# Patient Record
Sex: MALE | Race: WHITE | NOT HISPANIC OR LATINO
[De-identification: names, ages, dates, MRNs, and addresses within clinical notes are randomized per-mention and may not be internally consistent; named-entity substitution may affect disease eponyms.]

---

## 2018-01-11 ENCOUNTER — TRANSCRIPTION ENCOUNTER (OUTPATIENT)
Age: 33
End: 2018-01-11

## 2018-01-22 ENCOUNTER — TRANSCRIPTION ENCOUNTER (OUTPATIENT)
Age: 33
End: 2018-01-22

## 2019-01-24 ENCOUNTER — TRANSCRIPTION ENCOUNTER (OUTPATIENT)
Age: 34
End: 2019-01-24

## 2021-09-30 ENCOUNTER — APPOINTMENT (OUTPATIENT)
Dept: THORACIC SURGERY | Facility: CLINIC | Age: 36
End: 2021-09-30
Payer: MEDICAID

## 2021-09-30 ENCOUNTER — NON-APPOINTMENT (OUTPATIENT)
Age: 36
End: 2021-09-30

## 2021-09-30 VITALS
BODY MASS INDEX: 19.62 KG/M2 | RESPIRATION RATE: 17 BRPM | HEART RATE: 73 BPM | SYSTOLIC BLOOD PRESSURE: 117 MMHG | TEMPERATURE: 99 F | HEIGHT: 67 IN | WEIGHT: 125 LBS | OXYGEN SATURATION: 98 % | DIASTOLIC BLOOD PRESSURE: 79 MMHG

## 2021-09-30 PROCEDURE — 99205 OFFICE O/P NEW HI 60 MIN: CPT

## 2021-10-01 NOTE — PHYSICAL EXAM
[General Appearance - Alert] : alert [General Appearance - Well Nourished] : well nourished [Sclera] : the sclera and conjunctiva were normal [PERRL With Normal Accommodation] : pupils were equal in size, round, and reactive to light [Outer Ear] : the ears and nose were normal in appearance [Hearing Threshold Finger Rub Not [place]] : hearing was normal [Neck Appearance] : the appearance of the neck was normal [Neck Cervical Mass (___cm)] : no neck mass was observed [Respiration, Rhythm And Depth] : normal respiratory rhythm and effort [Exaggerated Use Of Accessory Muscles For Inspiration] : no accessory muscle use [Auscultation Breath Sounds / Voice Sounds] : lungs were clear to auscultation bilaterally [Heart Rate And Rhythm] : heart rate was normal and rhythm regular [Heart Sounds] : normal S1 and S2 [Examination Of The Chest] : the chest was normal in appearance [Chest Visual Inspection Thoracic Asymmetry] : no chest asymmetry [2+] : left 2+ [No Abnormalities] : the abdominal aorta was not enlarged and no bruit was heard [No Pulse Delay] : no pulse delay [Bowel Sounds] : normal bowel sounds [Abdomen Soft] : soft [Abdomen Tenderness] : non-tender [Cervical Lymph Nodes Enlarged Posterior Bilaterally] : posterior cervical [Cervical Lymph Nodes Enlarged Anterior Bilaterally] : anterior cervical [No CVA Tenderness] : no ~M costovertebral angle tenderness [Abnormal Walk] : normal gait [Nail Clubbing] : no clubbing  or cyanosis of the fingernails [Involuntary Movements] : no involuntary movements were seen [Skin Color & Pigmentation] : normal skin color and pigmentation [Skin Turgor] : normal skin turgor [] : no rash [Sensation] : the sensory exam was normal to light touch and pinprick [No Focal Deficits] : no focal deficits [Oriented To Time, Place, And Person] : oriented to person, place, and time [Affect] : the affect was normal [Mood] : the mood was normal [Fingers] :  capillary refill of the fingers was normal [Varicose Veins Of The Left Leg] : the patient has no varicose veins of the left leg [Varicose Veins Of The Right Leg] : the patient has no varicose veins of the right leg

## 2021-10-01 NOTE — ASSESSMENT
63.5 [FreeTextEntry1] :  36 year old male, never smoker, w/ hx of pectus excavatum since age of 8, he is referred by his PCP for evaluation of pectus excavatum. \par \par CT Chest on 9/21/21:\par - pectus excavatum is noted, Sb index is 26.8 cm/6.6cm=4.06\par - tree-in-bud opacities in upper lobes bilaterally with small irregular shaped cystic cavities in bilateral upper lobes\par \par \par I have reviewed the patient's medical records and diagnostic images at time of this office consultation and have made the following recommendation:\par 1. Chest deformity noted from pt, pt concerns about appearance of the chest, surgical option including Ellis procedure and Ellis bar placement were explained to pt in detail. He verbalized understanding and would like to complete workup and think about it. \par 2. PFT. \par 3. Echo to evaluate cardiac function. \par 4. RTC after above done. \par \par \par I personally performed the services described in the documentation, reviewed the documentation recorded by the scribe in my presence and it accurately and completely records my words and actions. \par \par I, Rosario Shane, ADONIS, am scribing for and the presence of Dr. Mikey Kam the following sections, HISTORY OF PRESENT ILLNESS, PAST MEDICAL/FAMILY/SOCIAL HISTORY; REVIEW OF SYSTEMS; VITAL SIGNS; PHYSICAL EXAM; DISPOSITION.\par

## 2021-10-01 NOTE — CONSULT LETTER
[Dear  ___] : Dear  [unfilled], [Consult Letter:] : I had the pleasure of evaluating your patient, [unfilled]. [( Thank you for referring [unfilled] for consultation for _____ )] : Thank you for referring [unfilled] for consultation for [unfilled] [Please see my note below.] : Please see my note below. [Consult Closing:] : Thank you very much for allowing me to participate in the care of this patient.  If you have any questions, please do not hesitate to contact me. [Sincerely,] : Sincerely, [FreeTextEntry2] : Dr. Norman Cheek (ref) [FreeTextEntry3] : Mikey Kam MD, MPH \par System Director of Thoracic Surgery \par Director of Comprehensive Lung and Foregut [place] \par Professor Cardiovascular & Thoracic Surgery  \par [facility] at [facility]\par

## 2021-10-01 NOTE — HISTORY OF PRESENT ILLNESS
[FreeTextEntry1] : Mr. JOYCE LIEBERMAN, 36 year old male, never smoker, w/ hx of pectus excavatum since age of 8, he is referred by his PCP for evaluation of pectus excavatum. \par \par CT Chest on 9/21/21:\par - pectus excavatum is noted, Sb index is 26.8 cm/6.6cm=4.06\par - tree-in-bud opacities in upper lobes bilaterally with small irregular shaped cystic cavities in bilateral upper lobes\par \par Pt presents today for CT Sx consultation, referred by Dr. Norman Cheek. The patient denies SOB, cough, chest pain, hemoptysis, palpitation, fever, recent illness, hospitalization and significant weight loss.\par

## 2021-10-22 DIAGNOSIS — Z01.818 ENCOUNTER FOR OTHER PREPROCEDURAL EXAMINATION: ICD-10-CM

## 2021-10-23 ENCOUNTER — APPOINTMENT (OUTPATIENT)
Dept: DISASTER EMERGENCY | Facility: CLINIC | Age: 36
End: 2021-10-23

## 2021-10-24 LAB — SARS-COV-2 N GENE NPH QL NAA+PROBE: NOT DETECTED

## 2021-10-27 ENCOUNTER — APPOINTMENT (OUTPATIENT)
Dept: PULMONOLOGY | Facility: CLINIC | Age: 36
End: 2021-10-27
Payer: MEDICAID

## 2021-10-27 VITALS
BODY MASS INDEX: 19.62 KG/M2 | OXYGEN SATURATION: 99 % | TEMPERATURE: 98.6 F | HEART RATE: 63 BPM | WEIGHT: 125 LBS | HEIGHT: 67 IN

## 2021-10-27 PROCEDURE — 94729 DIFFUSING CAPACITY: CPT

## 2021-10-27 PROCEDURE — 94010 BREATHING CAPACITY TEST: CPT

## 2021-10-27 PROCEDURE — 94726 PLETHYSMOGRAPHY LUNG VOLUMES: CPT

## 2021-11-30 ENCOUNTER — APPOINTMENT (OUTPATIENT)
Dept: PULMONOLOGY | Facility: CLINIC | Age: 36
End: 2021-11-30
Payer: MEDICAID

## 2021-11-30 VITALS
BODY MASS INDEX: 19.62 KG/M2 | HEART RATE: 105 BPM | OXYGEN SATURATION: 96 % | WEIGHT: 125 LBS | RESPIRATION RATE: 18 BRPM | HEIGHT: 67 IN

## 2021-11-30 DIAGNOSIS — J98.4 OTHER DISORDERS OF LUNG: ICD-10-CM

## 2021-11-30 PROCEDURE — 99203 OFFICE O/P NEW LOW 30 MIN: CPT

## 2021-12-03 LAB
A1AT SERPL-MCNC: 148 MG/DL
ALBUMIN SERPL ELPH-MCNC: 5 G/DL
ALP BLD-CCNC: 72 U/L
ALT SERPL-CCNC: 14 U/L
ANA SER IF-ACNC: NEGATIVE
ANION GAP SERPL CALC-SCNC: 16 MMOL/L
AST SERPL-CCNC: 18 U/L
BASOPHILS # BLD AUTO: 0.05 K/UL
BASOPHILS NFR BLD AUTO: 0.7 %
BILIRUB SERPL-MCNC: 0.2 MG/DL
BUN SERPL-MCNC: 25 MG/DL
CALCIUM SERPL-MCNC: 10.2 MG/DL
CCP AB SER IA-ACNC: <8 UNITS
CHLORIDE SERPL-SCNC: 103 MMOL/L
CO2 SERPL-SCNC: 21 MMOL/L
CREAT SERPL-MCNC: 1.02 MG/DL
DEPRECATED KAPPA LC FREE/LAMBDA SER: 1.1 RATIO
ENA SS-A AB SER IA-ACNC: <0.2 AL
ENA SS-B AB SER IA-ACNC: <0.2 AL
EOSINOPHIL # BLD AUTO: 0.23 K/UL
EOSINOPHIL NFR BLD AUTO: 3.3 %
GLUCOSE SERPL-MCNC: 130 MG/DL
HCT VFR BLD CALC: 44 %
HGB BLD-MCNC: 14.5 G/DL
HIV1+2 AB SPEC QL IA.RAPID: NONREACTIVE
IGA SER QL IEP: 252 MG/DL
IGG SER QL IEP: 1113 MG/DL
IGM SER QL IEP: 68 MG/DL
IMM GRANULOCYTES NFR BLD AUTO: 0.3 %
KAPPA LC CSF-MCNC: 1.12 MG/DL
KAPPA LC SERPL-MCNC: 1.23 MG/DL
LYMPHOCYTES # BLD AUTO: 1.34 K/UL
LYMPHOCYTES NFR BLD AUTO: 19.4 %
MAN DIFF?: NORMAL
MCHC RBC-ENTMCNC: 28.9 PG
MCHC RBC-ENTMCNC: 33 GM/DL
MCV RBC AUTO: 87.8 FL
MONOCYTES # BLD AUTO: 0.65 K/UL
MONOCYTES NFR BLD AUTO: 9.4 %
NEUTROPHILS # BLD AUTO: 4.63 K/UL
NEUTROPHILS NFR BLD AUTO: 66.9 %
PLATELET # BLD AUTO: 274 K/UL
POTASSIUM SERPL-SCNC: 4.4 MMOL/L
PROT SERPL-MCNC: 7.8 G/DL
RBC # BLD: 5.01 M/UL
RBC # FLD: 13 %
RF+CCP IGG SER-IMP: NEGATIVE
RHEUMATOID FACT SER QL: <10 IU/ML
SODIUM SERPL-SCNC: 140 MMOL/L
WBC # FLD AUTO: 6.92 K/UL

## 2021-12-08 NOTE — HISTORY OF PRESENT ILLNESS
[TextBox_4] : 35 yo M never smoker with a h/o pectus excavatum and abnormal CT chest presents for initial evaluation.\par Referred by Dr. Mikey Kam.\par Denies SOB or cough. \par Stable weight. Denies fever, chills or night sweats.\par Denies wheezing. \par Denies arthralgias, rashes. +peripheral neuropathy - feet and fingers for which he underwent extensive neurologic work up in 2014 with Dr. Ambar Melton in [place] including skin biopsy, found to have small fiber neuropathy on pathology.  \par  \par Denies significant cardiopulmonary history other than a hospitalization in 2010 for syncope and abnormal CT chest findings. Was treated for pneumonia at that time and followed up with Dr. Perez with serial CT chest, lost to follow up/p. Never had lung biopsy. \par \par CT chest 9/21/21: bilateral upper lobes with small irregular shaped cysts\par B/l lower lobe peripheral opacities. \par Review of previous CT chest imaging in 2010 and 2011 shows multiple peripheral lower lobe opacities which slowly improved but never resolved \par \par PFTs 10/27/21: moderate obstructive defect, mild decrease in DLCO. \par \par Past medical history includes HLD, chronic neck and back pain.\par Meds: Flexeril PRN \par \par Maternal grandmother: RA, PMR \par \par Quality metrics:\par Tobacco use - never\par ESS- NA\par \par Vaccines: \par COVID: April completed series- booster this Monday 12/6. \par Influenza: annually, \par Pneumococcal: NA\par

## 2021-12-08 NOTE — ASSESSMENT
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  \par Labs: CBC with diff, CMP, Immunoglobulins, RA, CCP, Sjrogren's Abs, HIV, Alpha 1 antitrypsin\par Echo results to be sent for review\par Follow up after.\par

## 2021-12-08 NOTE — PHYSICAL EXAM
[No Acute Distress] : no acute distress [Normal Oropharynx] : normal oropharynx [Normal Appearance] : normal appearance [No Neck Mass] : no neck mass [Normal Rate/Rhythm] : normal rate/rhythm [Normal S1, S2] : normal s1, s2 [No Murmurs] : no murmurs [No Resp Distress] : no resp distress [Clear to Auscultation Bilaterally] : clear to auscultation bilaterally [Benign] : benign [Normal Gait] : normal gait [No Clubbing] : no clubbing [No Cyanosis] : no cyanosis [No Edema] : no edema [FROM] : FROM [Normal Color/ Pigmentation] : normal color/ pigmentation [No Focal Deficits] : no focal deficits [Oriented x3] : oriented x3 [Normal Affect] : normal affect [TextBox_80] : pectus excavatum

## 2022-01-28 ENCOUNTER — NON-APPOINTMENT (OUTPATIENT)
Age: 37
End: 2022-01-28

## 2022-03-16 ENCOUNTER — APPOINTMENT (OUTPATIENT)
Dept: CT IMAGING | Facility: CLINIC | Age: 37
End: 2022-03-16
Payer: MEDICAID

## 2022-03-16 PROCEDURE — 71250 CT THORAX DX C-: CPT

## 2022-03-18 ENCOUNTER — APPOINTMENT (OUTPATIENT)
Dept: PULMONOLOGY | Facility: CLINIC | Age: 37
End: 2022-03-18
Payer: MEDICAID

## 2022-03-18 PROCEDURE — 99442: CPT

## 2023-03-15 ENCOUNTER — NON-APPOINTMENT (OUTPATIENT)
Age: 38
End: 2023-03-15

## 2023-03-16 ENCOUNTER — APPOINTMENT (OUTPATIENT)
Dept: THORACIC SURGERY | Facility: CLINIC | Age: 38
End: 2023-03-16
Payer: MEDICAID

## 2023-03-16 VITALS
HEART RATE: 97 BPM | RESPIRATION RATE: 18 BRPM | HEIGHT: 67 IN | OXYGEN SATURATION: 98 % | SYSTOLIC BLOOD PRESSURE: 117 MMHG | DIASTOLIC BLOOD PRESSURE: 87 MMHG | BODY MASS INDEX: 20.4 KG/M2 | WEIGHT: 130 LBS

## 2023-03-16 PROCEDURE — 99215 OFFICE O/P EST HI 40 MIN: CPT

## 2023-03-16 RX ORDER — CETIRIZINE HCL 10 MG
TABLET ORAL
Refills: 0 | Status: ACTIVE | COMMUNITY

## 2023-03-16 RX ORDER — UBIDECARENONE/VIT E ACET 100MG-5
CAPSULE ORAL
Refills: 0 | Status: ACTIVE | COMMUNITY

## 2023-03-16 RX ORDER — PSYLLIUM HUSK 0.4 G
CAPSULE ORAL
Refills: 0 | Status: ACTIVE | COMMUNITY

## 2023-03-20 NOTE — HISTORY OF PRESENT ILLNESS
[FreeTextEntry1] : Mr. JOYCE LIEBERMAN, 38 year old male, never smoker, w/ hx of pectus excavatum since age of 8, he is referred by his PCP for evaluation of pectus excavatum. \par \par CT Chest on 9/21/21:\par - pectus excavatum is noted, Sb index is 26.8 cm/6.6cm=4.06\par - tree-in-bud opacities in upper lobes bilaterally with small irregular shaped cystic cavities in bilateral upper lobes\par \par Pt initial seen in office 9/30/2021, referred by Dr. Norman Cheek. Chest deformity noted, pt was concerned about appearance, surgical option including Ellis procedure and Ellis bar placement were explained to pt in detail. He  would like to complete workup (PFTs and ECHO) and think about it. Pt has lost f/u since then.\par \par PFT 10/27/2021: FVC 80%, FEV1: 66%, DLCO:68%\par \par CT Chest 3/16/2022:\par -similar upper lobe cavitary lesions with surrounding nodularity, similar to prior CT scan\par -B/L lower lobe peripheral nodularity/scarring in the same location of nodular consolidations seen on prior CTs\par \par Patient is here today for a follow up. Patient states he has made his decision and is here to proceed with Ellis bar placement. Denies chest pain, SOB, & fever. States he's feeling great. \par

## 2023-03-20 NOTE — CONSULT LETTER
[Dear  ___] : Dear  [unfilled], [Consult Letter:] : I had the pleasure of evaluating your patient, [unfilled]. [( Thank you for referring [unfilled] for consultation for _____ )] : Thank you for referring [unfilled] for consultation for [unfilled] [Please see my note below.] : Please see my note below. [Consult Closing:] : Thank you very much for allowing me to participate in the care of this patient.  If you have any questions, please do not hesitate to contact me. [Sincerely,] : Sincerely, [FreeTextEntry2] : Dr. Norman Cheek (ref) [FreeTextEntry3] : Mikey Kam MD, MPH \par System Director of Thoracic Surgery \par Director of Comprehensive Lung and Foregut [place] \par Professor Cardiovascular & Thoracic Surgery  \par [facility] [employer] at [facility]\par

## 2023-03-20 NOTE — ASSESSMENT
[FreeTextEntry1] : 38 year old male, never smoker, w/ hx of pectus excavatum since age of 8, he is referred by his PCP for evaluation of pectus excavatum. \par \par Pt initial seen in office 9/30/2021, referred by Dr. Norman Cheek. Chest deformity noted, pt was concerned about appearance, surgical option including Ellis procedure and Ellis bar placement were explained to pt in detail. He  would like to complete workup (PFTs and ECHO) and think about it. Pt has lost f/u since then.\par \par I have reviewed the patient's medical records and diagnostic images at time of this office consultation and have made the following recommendation:\par 1. Will F/U echo from Dr. Brooks Frnacisco\par 2. Since patient is ready to proceed, will schedule B/L VATS R.A. repair pectus Ellis procedure, cryoablation of intercostal nerve (4/11 last case, will order 2 Ellis bars via [employer]). Risks and benefits and alternatives explained to patient, all questions answered, patient agreed to proceed with surgery. Patient will need medical clearance, PST and covid test prior to surgery. \par \par \par I, ERIKA Paz, personally performed the evaluation and management (E/M) services for this established patient who presents today with (a) new problem(s)/exacerbation of (an) existing condition(s). That E/M includes conducting the examination, assessing all new/exacerbated conditions, and establishing a new plan of care. Today, my RN, Enrrique Dean, was here to observe my evaluation and management services for this new problem/exacerbated condition to be followed going forward.\par \par

## 2023-03-27 ENCOUNTER — OUTPATIENT (OUTPATIENT)
Dept: OUTPATIENT SERVICES | Facility: HOSPITAL | Age: 38
LOS: 1 days | End: 2023-03-27
Payer: MEDICAID

## 2023-03-27 VITALS
HEIGHT: 68 IN | SYSTOLIC BLOOD PRESSURE: 125 MMHG | RESPIRATION RATE: 18 BRPM | HEART RATE: 83 BPM | DIASTOLIC BLOOD PRESSURE: 86 MMHG | OXYGEN SATURATION: 97 % | TEMPERATURE: 98 F | WEIGHT: 132.06 LBS

## 2023-03-27 DIAGNOSIS — Q67.6 PECTUS EXCAVATUM: ICD-10-CM

## 2023-03-27 DIAGNOSIS — Z91.09 OTHER ALLERGY STATUS, OTHER THAN TO DRUGS AND BIOLOGICAL SUBSTANCES: ICD-10-CM

## 2023-03-27 DIAGNOSIS — Z98.890 OTHER SPECIFIED POSTPROCEDURAL STATES: Chronic | ICD-10-CM

## 2023-03-27 LAB
ALBUMIN SERPL ELPH-MCNC: 4.6 G/DL — SIGNIFICANT CHANGE UP (ref 3.3–5)
ALP SERPL-CCNC: 65 U/L — SIGNIFICANT CHANGE UP (ref 40–120)
ALT FLD-CCNC: 14 U/L — SIGNIFICANT CHANGE UP (ref 4–41)
ANION GAP SERPL CALC-SCNC: 14 MMOL/L — SIGNIFICANT CHANGE UP (ref 7–14)
AST SERPL-CCNC: 22 U/L — SIGNIFICANT CHANGE UP (ref 4–40)
BASOPHILS # BLD AUTO: 0.06 K/UL — SIGNIFICANT CHANGE UP (ref 0–0.2)
BASOPHILS NFR BLD AUTO: 0.7 % — SIGNIFICANT CHANGE UP (ref 0–2)
BILIRUB DIRECT SERPL-MCNC: <0.2 MG/DL — SIGNIFICANT CHANGE UP (ref 0–0.3)
BILIRUB INDIRECT FLD-MCNC: >0.1 MG/DL — SIGNIFICANT CHANGE UP (ref 0–1)
BILIRUB SERPL-MCNC: 0.3 MG/DL — SIGNIFICANT CHANGE UP (ref 0.2–1.2)
BUN SERPL-MCNC: 25 MG/DL — HIGH (ref 7–23)
CALCIUM SERPL-MCNC: 9.7 MG/DL — SIGNIFICANT CHANGE UP (ref 8.4–10.5)
CHLORIDE SERPL-SCNC: 101 MMOL/L — SIGNIFICANT CHANGE UP (ref 98–107)
CO2 SERPL-SCNC: 24 MMOL/L — SIGNIFICANT CHANGE UP (ref 22–31)
CREAT SERPL-MCNC: 1 MG/DL — SIGNIFICANT CHANGE UP (ref 0.5–1.3)
EGFR: 99 ML/MIN/1.73M2 — SIGNIFICANT CHANGE UP
EOSINOPHIL # BLD AUTO: 0.5 K/UL — SIGNIFICANT CHANGE UP (ref 0–0.5)
EOSINOPHIL NFR BLD AUTO: 6.2 % — HIGH (ref 0–6)
GLUCOSE SERPL-MCNC: 76 MG/DL — SIGNIFICANT CHANGE UP (ref 70–99)
HCT VFR BLD CALC: 38.1 % — LOW (ref 39–50)
HGB BLD-MCNC: 12.3 G/DL — LOW (ref 13–17)
IANC: 5.34 K/UL — SIGNIFICANT CHANGE UP (ref 1.8–7.4)
IMM GRANULOCYTES NFR BLD AUTO: 0.1 % — SIGNIFICANT CHANGE UP (ref 0–0.9)
LYMPHOCYTES # BLD AUTO: 1.45 K/UL — SIGNIFICANT CHANGE UP (ref 1–3.3)
LYMPHOCYTES # BLD AUTO: 18 % — SIGNIFICANT CHANGE UP (ref 13–44)
MCHC RBC-ENTMCNC: 28 PG — SIGNIFICANT CHANGE UP (ref 27–34)
MCHC RBC-ENTMCNC: 32.3 GM/DL — SIGNIFICANT CHANGE UP (ref 32–36)
MCV RBC AUTO: 86.6 FL — SIGNIFICANT CHANGE UP (ref 80–100)
MONOCYTES # BLD AUTO: 0.7 K/UL — SIGNIFICANT CHANGE UP (ref 0–0.9)
MONOCYTES NFR BLD AUTO: 8.7 % — SIGNIFICANT CHANGE UP (ref 2–14)
NEUTROPHILS # BLD AUTO: 5.34 K/UL — SIGNIFICANT CHANGE UP (ref 1.8–7.4)
NEUTROPHILS NFR BLD AUTO: 66.3 % — SIGNIFICANT CHANGE UP (ref 43–77)
NRBC # BLD: 0 /100 WBCS — SIGNIFICANT CHANGE UP (ref 0–0)
NRBC # FLD: 0 K/UL — SIGNIFICANT CHANGE UP (ref 0–0)
PLATELET # BLD AUTO: 232 K/UL — SIGNIFICANT CHANGE UP (ref 150–400)
POTASSIUM SERPL-MCNC: 3.7 MMOL/L — SIGNIFICANT CHANGE UP (ref 3.5–5.3)
POTASSIUM SERPL-SCNC: 3.7 MMOL/L — SIGNIFICANT CHANGE UP (ref 3.5–5.3)
PROT SERPL-MCNC: 7.4 G/DL — SIGNIFICANT CHANGE UP (ref 6–8.3)
RBC # BLD: 4.4 M/UL — SIGNIFICANT CHANGE UP (ref 4.2–5.8)
RBC # FLD: 13.4 % — SIGNIFICANT CHANGE UP (ref 10.3–14.5)
SODIUM SERPL-SCNC: 139 MMOL/L — SIGNIFICANT CHANGE UP (ref 135–145)
WBC # BLD: 8.06 K/UL — SIGNIFICANT CHANGE UP (ref 3.8–10.5)
WBC # FLD AUTO: 8.06 K/UL — SIGNIFICANT CHANGE UP (ref 3.8–10.5)

## 2023-03-27 PROCEDURE — 93010 ELECTROCARDIOGRAM REPORT: CPT

## 2023-03-27 NOTE — H&P PST ADULT - HISTORY OF PRESENT ILLNESS
39 yo male presents to PST unit with pre-op diagnosis of pectus excavatum scheduled for robotic bilateral video assisted thoracoscopy, repair pectus kajal procedure, cryoablation of intercostal nerves with Dr. Kam.

## 2023-03-27 NOTE — H&P PST ADULT - ASSESSMENT
37 yo male presents to PST unit with pre-op diagnosis of pectus excavatum scheduled for robotic bilateral video assisted thoracoscopy, repair pectus kajal procedure, cryoablation of intercostal nerves with Dr. Kam.

## 2023-03-27 NOTE — H&P PST ADULT - NSICDXPASTMEDICALHX_GEN_ALL_CORE_FT
PAST MEDICAL HISTORY:  Anxiety     Environmental allergies     Food aversion Pt Notes once developed HIVES which he thinks may have been triggered by Chinese food - MSG (noted from waist up)    OCD (obsessive compulsive disorder)     Peripheral neuropathy Idiopathic - pt notes trying to determine cause - notes parternal grandmother and Father also have it - Hands & Feet

## 2023-03-27 NOTE — H&P PST ADULT - FUNCTIONAL STATUS
Walks 1 to 2 blocks, climbs 1 flight of stairs, ADLs 5-Walks 1 to 2 blocks, climbs 1 flight of stairs, ADLs

## 2023-03-27 NOTE — H&P PST ADULT - NEUROLOGICAL COMMENTS
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 Ambar Melton neurologist last seen years ago

## 2023-03-27 NOTE — H&P PST ADULT - PROBLEM SELECTOR PLAN 1
Scheduled for robotic bilateral video assisted thoracoscopy, repair pectus kajal procedure, cryoablation of intercostal nerves with Dr. Kam on 4/11/2023.  Verbal and written pre-op instructions provided to patient. Patient verbalized understanding and will call surgeons office for revised instructions if surgery is rescheduled.   Pepcid for GI prophylaxis provided.   patient given verbal and written instruction with teach back on chlorhexidine shampoo, and the patient verbalized understanding with return demonstration.   Patient will obtain medical clearance as per surgeons request-copy requested by PST NP for high risk surgery

## 2023-03-27 NOTE — H&P PST ADULT - NSICDXFAMILYHX_GEN_ALL_CORE_FT
FAMILY HISTORY:  Father  Still living? Yes, Estimated age: 51-60  Family history of peripheral neuropathy, Age at diagnosis: Age Unknown    Mother  Still living? Yes, Estimated age: 51-60  Hypercholesterolemia, Age at diagnosis: Age Unknown

## 2023-03-27 NOTE — H&P PST ADULT - WILL THE PATIENT ACCEPT THE PFIZER COVID-19 VACCINE IF ELIGIBLE AND IT IS AVAILABLE?
Cholesterol is well controlled on atorvastatin 40 mg daily  Continue as prescribed, will continue to monitor lipid panel  No

## 2023-03-30 ENCOUNTER — APPOINTMENT (OUTPATIENT)
Dept: THORACIC SURGERY | Facility: CLINIC | Age: 38
End: 2023-03-30
Payer: MEDICAID

## 2023-04-06 ENCOUNTER — APPOINTMENT (OUTPATIENT)
Dept: THORACIC SURGERY | Facility: CLINIC | Age: 38
End: 2023-04-06

## 2023-04-06 ENCOUNTER — APPOINTMENT (OUTPATIENT)
Dept: THORACIC SURGERY | Facility: CLINIC | Age: 38
End: 2023-04-06
Payer: MEDICAID

## 2023-04-06 PROBLEM — Z91.09 OTHER ALLERGY STATUS, OTHER THAN TO DRUGS AND BIOLOGICAL SUBSTANCES: Chronic | Status: ACTIVE | Noted: 2023-03-27

## 2023-04-06 PROBLEM — F42.9 OBSESSIVE-COMPULSIVE DISORDER, UNSPECIFIED: Chronic | Status: ACTIVE | Noted: 2023-03-27

## 2023-04-06 PROCEDURE — 99443: CPT

## 2023-04-07 ENCOUNTER — LABORATORY RESULT (OUTPATIENT)
Age: 38
End: 2023-04-07

## 2023-04-07 NOTE — REASON FOR VISIT
[This encounter was initiated by telehealth (audio with video) and converted to telephone (audio only) due to technical difficulties.] : This encounter was initiated by telehealth (audio with video) and converted to telephone (audio only) due to technical difficulties.

## 2023-04-10 NOTE — ASU PATIENT PROFILE, ADULT - FALL HARM RISK - UNIVERSAL INTERVENTIONS
Bed in lowest position, wheels locked, appropriate side rails in place/Call bell, personal items and telephone in reach/Instruct patient to call for assistance before getting out of bed or chair/Non-slip footwear when patient is out of bed/[place] to call system/Physically safe environment - no spills, clutter or unnecessary equipment/Purposeful Proactive Rounding/Room/bathroom lighting operational, light cord in reach

## 2023-04-11 ENCOUNTER — APPOINTMENT (OUTPATIENT)
Dept: THORACIC SURGERY | Facility: HOSPITAL | Age: 38
End: 2023-04-11

## 2023-04-11 ENCOUNTER — TRANSCRIPTION ENCOUNTER (OUTPATIENT)
Age: 38
End: 2023-04-11

## 2023-04-11 ENCOUNTER — INPATIENT (INPATIENT)
Facility: HOSPITAL | Age: 38
LOS: 0 days | Discharge: HOME | End: 2023-04-12
Attending: THORACIC SURGERY (CARDIOTHORACIC VASCULAR SURGERY) | Admitting: THORACIC SURGERY (CARDIOTHORACIC VASCULAR SURGERY)
Payer: MEDICAID

## 2023-04-11 VITALS
SYSTOLIC BLOOD PRESSURE: 132 MMHG | WEIGHT: 132.06 LBS | OXYGEN SATURATION: 100 % | HEIGHT: 67 IN | RESPIRATION RATE: 17 BRPM | DIASTOLIC BLOOD PRESSURE: 97 MMHG | HEART RATE: 90 BPM | TEMPERATURE: 99 F

## 2023-04-11 DIAGNOSIS — Q67.6 PECTUS EXCAVATUM: ICD-10-CM

## 2023-04-11 DIAGNOSIS — Z98.890 OTHER SPECIFIED POSTPROCEDURAL STATES: Chronic | ICD-10-CM

## 2023-04-11 PROCEDURE — 71045 X-RAY EXAM CHEST 1 VIEW: CPT | Mod: 26

## 2023-04-11 PROCEDURE — 99233 SBSQ HOSP IP/OBS HIGH 50: CPT

## 2023-04-11 PROCEDURE — 21743 REPAIR STERNUM/NUSS W/SCOPE: CPT

## 2023-04-11 PROCEDURE — 21743 REPAIR STERNUM/NUSS W/SCOPE: CPT | Mod: AS

## 2023-04-11 DEVICE — BAR PECTUS TI STABILIZER: Type: IMPLANTABLE DEVICE | Status: FUNCTIONAL

## 2023-04-11 DEVICE — PROBE CRYOABLATION SPHERE 11CM: Type: IMPLANTABLE DEVICE | Status: FUNCTIONAL

## 2023-04-11 DEVICE — IMPLANTABLE DEVICE: Type: IMPLANTABLE DEVICE | Status: FUNCTIONAL

## 2023-04-11 RX ORDER — HEPARIN SODIUM 5000 [USP'U]/ML
5000 INJECTION INTRAVENOUS; SUBCUTANEOUS EVERY 8 HOURS
Refills: 0 | Status: DISCONTINUED | OUTPATIENT
Start: 2023-04-11 | End: 2023-04-12

## 2023-04-11 RX ORDER — GABAPENTIN 400 MG/1
300 CAPSULE ORAL ONCE
Refills: 0 | Status: COMPLETED | OUTPATIENT
Start: 2023-04-11 | End: 2023-04-11

## 2023-04-11 RX ORDER — MORPHINE SULFATE 50 MG/1
0.3 CAPSULE, EXTENDED RELEASE ORAL ONCE
Refills: 0 | Status: DISCONTINUED | OUTPATIENT
Start: 2023-04-11 | End: 2023-04-12

## 2023-04-11 RX ORDER — HYDROMORPHONE HYDROCHLORIDE 2 MG/ML
1 INJECTION INTRAMUSCULAR; INTRAVENOUS; SUBCUTANEOUS
Refills: 0 | Status: DISCONTINUED | OUTPATIENT
Start: 2023-04-11 | End: 2023-04-12

## 2023-04-11 RX ORDER — CYCLOBENZAPRINE HYDROCHLORIDE 10 MG/1
1 TABLET, FILM COATED ORAL
Refills: 0 | DISCHARGE

## 2023-04-11 RX ORDER — ONDANSETRON 8 MG/1
4 TABLET, FILM COATED ORAL EVERY 6 HOURS
Refills: 0 | Status: DISCONTINUED | OUTPATIENT
Start: 2023-04-11 | End: 2023-04-12

## 2023-04-11 RX ORDER — OXYCODONE HYDROCHLORIDE 5 MG/1
5 TABLET ORAL
Refills: 0 | Status: DISCONTINUED | OUTPATIENT
Start: 2023-04-11 | End: 2023-04-12

## 2023-04-11 RX ORDER — HEPARIN SODIUM 5000 [USP'U]/ML
5000 INJECTION INTRAVENOUS; SUBCUTANEOUS ONCE
Refills: 0 | Status: COMPLETED | OUTPATIENT
Start: 2023-04-11 | End: 2023-04-11

## 2023-04-11 RX ORDER — SENNA PLUS 8.6 MG/1
2 TABLET ORAL AT BEDTIME
Refills: 0 | Status: DISCONTINUED | OUTPATIENT
Start: 2023-04-11 | End: 2023-04-12

## 2023-04-11 RX ORDER — HYDROMORPHONE HYDROCHLORIDE 2 MG/ML
0.5 INJECTION INTRAMUSCULAR; INTRAVENOUS; SUBCUTANEOUS
Refills: 0 | Status: DISCONTINUED | OUTPATIENT
Start: 2023-04-11 | End: 2023-04-12

## 2023-04-11 RX ORDER — CYCLOBENZAPRINE HYDROCHLORIDE 10 MG/1
10 TABLET, FILM COATED ORAL THREE TIMES A DAY
Refills: 0 | Status: DISCONTINUED | OUTPATIENT
Start: 2023-04-11 | End: 2023-04-12

## 2023-04-11 RX ORDER — SODIUM CHLORIDE 9 MG/ML
1000 INJECTION, SOLUTION INTRAVENOUS
Refills: 0 | Status: DISCONTINUED | OUTPATIENT
Start: 2023-04-11 | End: 2023-04-12

## 2023-04-11 RX ORDER — OXYCODONE HYDROCHLORIDE 5 MG/1
10 TABLET ORAL
Refills: 0 | Status: DISCONTINUED | OUTPATIENT
Start: 2023-04-11 | End: 2023-04-12

## 2023-04-11 RX ORDER — ACETAMINOPHEN 500 MG
975 TABLET ORAL ONCE
Refills: 0 | Status: COMPLETED | OUTPATIENT
Start: 2023-04-11 | End: 2023-04-11

## 2023-04-11 RX ORDER — CLOMIPRAMINE HYDROCHLORIDE 50 MG/1
1 CAPSULE ORAL
Refills: 0 | DISCHARGE

## 2023-04-11 RX ORDER — NALOXONE HYDROCHLORIDE 4 MG/.1ML
0.1 SPRAY NASAL
Refills: 0 | Status: DISCONTINUED | OUTPATIENT
Start: 2023-04-11 | End: 2023-04-12

## 2023-04-11 RX ORDER — ONDANSETRON 8 MG/1
4 TABLET, FILM COATED ORAL ONCE
Refills: 0 | Status: COMPLETED | OUTPATIENT
Start: 2023-04-11 | End: 2023-04-11

## 2023-04-11 RX ORDER — POLYETHYLENE GLYCOL 3350 17 G/17G
17 POWDER, FOR SOLUTION ORAL DAILY
Refills: 0 | Status: DISCONTINUED | OUTPATIENT
Start: 2023-04-11 | End: 2023-04-12

## 2023-04-11 RX ADMIN — Medication 975 MILLIGRAM(S): at 12:36

## 2023-04-11 RX ADMIN — GABAPENTIN 300 MILLIGRAM(S): 400 CAPSULE ORAL at 12:36

## 2023-04-11 RX ADMIN — SODIUM CHLORIDE 30 MILLILITER(S): 9 INJECTION, SOLUTION INTRAVENOUS at 20:10

## 2023-04-11 RX ADMIN — ONDANSETRON 4 MILLIGRAM(S): 8 TABLET, FILM COATED ORAL at 17:59

## 2023-04-11 RX ADMIN — HEPARIN SODIUM 5000 UNIT(S): 5000 INJECTION INTRAVENOUS; SUBCUTANEOUS at 22:27

## 2023-04-11 RX ADMIN — HEPARIN SODIUM 5000 UNIT(S): 5000 INJECTION INTRAVENOUS; SUBCUTANEOUS at 12:37

## 2023-04-11 RX ADMIN — ONDANSETRON 4 MILLIGRAM(S): 8 TABLET, FILM COATED ORAL at 22:26

## 2023-04-11 NOTE — PATIENT PROFILE ADULT - FALL HARM RISK - RISK INTERVENTIONS
Assistance OOB with selected safe patient handling equipment/Assistance with ambulation/Communicate Fall Risk and Risk Factors to all staff, patient, and family/Monitor gait and stability/Reinforce activity limits and safety measures with patient and family/Sit up slowly, dangle for a short time, stand at bedside before walking/Use of alarms - bed, chair and/or voice tab/Visual Cue: Yellow wristband/Bed in lowest position, wheels locked, appropriate side rails in place/Call bell, personal items and telephone in reach/Instruct patient to call for assistance before getting out of bed or chair/Non-slip footwear when patient is out of bed/[place] to call system/Physically safe environment - no spills, clutter or unnecessary equipment/Purposeful Proactive Rounding/Room/bathroom lighting operational, light cord in reach

## 2023-04-11 NOTE — ASSESSMENT
[FreeTextEntry1] : 38 year old male, never smoker, w/ hx of pectus excavatum since age of 8, he is referred by his PCP for evaluation of pectus excavatum. \par \par Pt initial seen in office 9/30/2021, referred by Dr. Norman Cheek. Chest deformity noted, pt was concerned about appearance, surgical option including Ellis procedure and Ellis bar placement were explained to pt in detail. He  would like to complete workup (PFTs and ECHO) and think about it. Pt has lost f/u since then.\par \par I have reviewed the patient's medical records and diagnostic images at time of this office consultation and have made the following recommendation:\par 1. Surgery details reviewed with pt again, post-op care, possible complications also reviewed. Pt verbalized understanding. Surgery as scheduled \par \par \par I, ERIKA Paz, personally performed the evaluation and management (E/M) services for this established patient who presents today with (a) new problem(s)/exacerbation of (an) existing condition(s).  That E/M includes conducting the examination, assessing all new/exacerbated conditions, and establishing a new plan of care.  Today, my ACP, Glenys Webber NP was here to observe my evaluation and management services for this new problem/exacerbated condition to be followed going forward.\par \par \par

## 2023-04-11 NOTE — HISTORY OF PRESENT ILLNESS
[FreeTextEntry1] : Mr. JOYCE LIEBERMAN, 38 year old male, never smoker, w/ hx of pectus excavatum since age of 8, he is referred by his PCP for evaluation of pectus excavatum. \par \par CT Chest on 9/21/21:\par - pectus excavatum is noted, Sb index is 26.8 cm/6.6cm=4.06\par - tree-in-bud opacities in upper lobes bilaterally with small irregular shaped cystic cavities in bilateral upper lobes\par \par Pt initial seen in office 9/30/2021, referred by Dr. Norman Cheek. Chest deformity noted, pt was concerned about appearance, surgical option including Ellis procedure and Ellis bar placement were explained to pt in detail. He  would like to complete workup (PFTs and ECHO) and think about it. Pt has lost f/u since then.\par \par PFT 10/27/2021: FVC 80%, FEV1: 66%, DLCO:68%\par \par CT Chest 3/16/2022:\par -similar upper lobe cavitary lesions with surrounding nodularity, similar to prior CT scan\par -B/L lower lobe peripheral nodularity/scarring in the same location of nodular consolidations seen on prior CTs\par \par ECHO from Oct 2021 by Dr. Brooks Francisco showed diastolic dysfunction; pulm insufficiency and trace tricuspid insufficiency.\par \par Patient is followed today via Telehealth visit to discuss Pectus surgery 4/11/23. \par \par  No

## 2023-04-11 NOTE — CONSULT LETTER
[FreeTextEntry2] : Dr. Norman Cheek (ref) [FreeTextEntry3] : Mikey Kam MD, MPH \par System Director of Thoracic Surgery \par Director of Comprehensive Lung and Foregut [place] \par Professor Cardiovascular & Thoracic Surgery  \par [facility] [employer] at [facility]\par

## 2023-04-12 ENCOUNTER — TRANSCRIPTION ENCOUNTER (OUTPATIENT)
Age: 38
End: 2023-04-12

## 2023-04-12 VITALS
OXYGEN SATURATION: 100 % | DIASTOLIC BLOOD PRESSURE: 99 MMHG | RESPIRATION RATE: 24 BRPM | SYSTOLIC BLOOD PRESSURE: 138 MMHG | HEART RATE: 93 BPM

## 2023-04-12 LAB
ANION GAP SERPL CALC-SCNC: 13 MMOL/L — SIGNIFICANT CHANGE UP (ref 7–14)
BUN SERPL-MCNC: 19 MG/DL — SIGNIFICANT CHANGE UP (ref 7–23)
CALCIUM SERPL-MCNC: 9 MG/DL — SIGNIFICANT CHANGE UP (ref 8.4–10.5)
CHLORIDE SERPL-SCNC: 101 MMOL/L — SIGNIFICANT CHANGE UP (ref 98–107)
CO2 SERPL-SCNC: 23 MMOL/L — SIGNIFICANT CHANGE UP (ref 22–31)
CREAT SERPL-MCNC: 0.83 MG/DL — SIGNIFICANT CHANGE UP (ref 0.5–1.3)
EGFR: 115 ML/MIN/1.73M2 — SIGNIFICANT CHANGE UP
GLUCOSE SERPL-MCNC: 107 MG/DL — HIGH (ref 70–99)
HCT VFR BLD CALC: 37.2 % — LOW (ref 39–50)
HGB BLD-MCNC: 12.2 G/DL — LOW (ref 13–17)
MAGNESIUM SERPL-MCNC: 2.1 MG/DL — SIGNIFICANT CHANGE UP (ref 1.6–2.6)
MCHC RBC-ENTMCNC: 29 PG — SIGNIFICANT CHANGE UP (ref 27–34)
MCHC RBC-ENTMCNC: 32.8 GM/DL — SIGNIFICANT CHANGE UP (ref 32–36)
MCV RBC AUTO: 88.6 FL — SIGNIFICANT CHANGE UP (ref 80–100)
NRBC # BLD: 0 /100 WBCS — SIGNIFICANT CHANGE UP (ref 0–0)
NRBC # FLD: 0 K/UL — SIGNIFICANT CHANGE UP (ref 0–0)
PHOSPHATE SERPL-MCNC: 3 MG/DL — SIGNIFICANT CHANGE UP (ref 2.5–4.5)
PLATELET # BLD AUTO: 237 K/UL — SIGNIFICANT CHANGE UP (ref 150–400)
POTASSIUM SERPL-MCNC: 4.3 MMOL/L — SIGNIFICANT CHANGE UP (ref 3.5–5.3)
POTASSIUM SERPL-SCNC: 4.3 MMOL/L — SIGNIFICANT CHANGE UP (ref 3.5–5.3)
RBC # BLD: 4.2 M/UL — SIGNIFICANT CHANGE UP (ref 4.2–5.8)
RBC # FLD: 13.8 % — SIGNIFICANT CHANGE UP (ref 10.3–14.5)
SODIUM SERPL-SCNC: 137 MMOL/L — SIGNIFICANT CHANGE UP (ref 135–145)
WBC # BLD: 13.6 K/UL — HIGH (ref 3.8–10.5)
WBC # FLD AUTO: 13.6 K/UL — HIGH (ref 3.8–10.5)

## 2023-04-12 PROCEDURE — 99232 SBSQ HOSP IP/OBS MODERATE 35: CPT

## 2023-04-12 PROCEDURE — 71045 X-RAY EXAM CHEST 1 VIEW: CPT | Mod: 26

## 2023-04-12 RX ORDER — OXYCODONE HYDROCHLORIDE 5 MG/1
1 TABLET ORAL
Qty: 20 | Refills: 0
Start: 2023-04-12 | End: 2023-04-16

## 2023-04-12 RX ORDER — OXYCODONE HYDROCHLORIDE 5 MG/1
1 TABLET ORAL
Qty: 30 | Refills: 0
Start: 2023-04-12 | End: 2023-04-16

## 2023-04-12 RX ORDER — IBUPROFEN 200 MG
1 TABLET ORAL
Qty: 42 | Refills: 0
Start: 2023-04-12 | End: 2023-04-25

## 2023-04-12 RX ORDER — ACETAMINOPHEN 500 MG
1000 TABLET ORAL ONCE
Refills: 0 | Status: COMPLETED | OUTPATIENT
Start: 2023-04-12 | End: 2023-04-12

## 2023-04-12 RX ADMIN — Medication 1000 MILLIGRAM(S): at 11:30

## 2023-04-12 RX ADMIN — HEPARIN SODIUM 5000 UNIT(S): 5000 INJECTION INTRAVENOUS; SUBCUTANEOUS at 05:54

## 2023-04-12 RX ADMIN — OXYCODONE HYDROCHLORIDE 10 MILLIGRAM(S): 5 TABLET ORAL at 16:15

## 2023-04-12 RX ADMIN — Medication 400 MILLIGRAM(S): at 11:00

## 2023-04-12 RX ADMIN — OXYCODONE HYDROCHLORIDE 10 MILLIGRAM(S): 5 TABLET ORAL at 17:00

## 2023-04-12 RX ADMIN — HEPARIN SODIUM 5000 UNIT(S): 5000 INJECTION INTRAVENOUS; SUBCUTANEOUS at 14:14

## 2023-04-12 NOTE — DISCHARGE NOTE NURSING/CASE MANAGEMENT/SOCIAL WORK - PATIENT PORTAL LINK FT
You can access the FollowMyHealth Patient Portal offered by [facility] by registering at the following website: [email or web address]. By joining [employer]’s FollowMyHealth portal, you will also be able to view your health information using other applications (apps) compatible with our system.

## 2023-04-12 NOTE — DISCHARGE NOTE PROVIDER - NSDCFUADDINST_GEN_ALL_CORE_FT
Follow up with Dr. Kam in 7-14 days. Call Thoracic Surgery office [phone] to schedule an appointment. Please obtain a CXR 1-2 days prior to your appointment and bring a copy with you.  Please make an appointment with your primary provider.  You may shower in 24 hours with dressing and remove in the shower.  Keep the wound clean and dry it well after showering.  No driving while taking pain meds.   You can remove the dressing for shower and replace with band-aid after showering if you think it is necessary, otherwise leave it open it air.    Call your doctor if notice pus at the surgical site.    Do not be alarmed with shoulder, neck or back pain.  This could be due to your positioning on the operating table and is muscular pain. Call your doctor for signs of wound infection such as wide area of redness beyond the edges of your incision, pus coming from incisional or drainage tube site, unusual or new swelling and fever greater than 101 degrees.  Go to ER for prolonged shortness of breath that gets worse or sudden onset or severe shortness of breath that does not get better with rest.

## 2023-04-12 NOTE — PROGRESS NOTE ADULT - ASSESSMENT
ANESTHESIA POSTOP CHECK    38y Male POSTOP DAY 1     Vital Signs Last 24 Hrs  T(C): 36.8 (12 Apr 2023 08:00), Max: 37.1 (11 Apr 2023 12:21)  T(F): 98.3 (12 Apr 2023 08:00), Max: 98.8 (11 Apr 2023 12:21)  HR: 73 (12 Apr 2023 10:00) (69 - 103)  BP: 116/81 (12 Apr 2023 10:00) (108/69 - 147/95)  BP(mean): 93 (12 Apr 2023 10:00) (82 - 110)  RR: 13 (12 Apr 2023 10:00) (10 - 23)  SpO2: 100% (12 Apr 2023 10:00) (95% - 100%)    Parameters below as of 12 Apr 2023 11:00  Patient On (Oxygen Delivery Method): room air            [x ] NO APPARENT ANESTHESIA COMPLICATIONS

## 2023-04-12 NOTE — DISCHARGE NOTE PROVIDER - HOSPITAL COURSE
37 yo male with history pectus excavatum. Patient underwent a scheduled robotic bilateral video assisted thoracoscopy, repair pectus kajal procedure, cryoablation of intercostal nerves with Dr. Kam. Post-operative course uncomplicated. Patient seen and examined at bedside with no complaints.   ICU Vital Signs Last 24 Hrs  T(C): 37.1 (12 Apr 2023 16:00), Max: 37.1 (12 Apr 2023 12:00)  T(F): 98.7 (12 Apr 2023 16:00), Max: 98.8 (12 Apr 2023 12:00)  HR: 85 (12 Apr 2023 16:00) (69 - 103)  BP: 128/85 (12 Apr 2023 16:00) (108/69 - 147/95)  BP(mean): 99 (12 Apr 2023 16:00) (82 - 110)  ABP: --  ABP(mean): --  RR: 20 (12 Apr 2023 16:00) (10 - 23)  SpO2: 100% (12 Apr 2023 16:00) (95% - 100%)    O2 Parameters below as of 12 Apr 2023 17:00    Patient On (Oxygen Delivery Method): room air  General: WN/WD NAD  Neurology: Awake, nonfocal, CAMERON x 4  Eyes: Scleras clear, PERRLA/ EOMI, Gross vision intact  ENT:Gross hearing intact, grossly patent pharynx, no stridor  Neck: Neck supple, trachea midline, No JVD,   Respiratory: CTA B/L, No wheezing, rales, rhonchi  CV: RRR, S1S2, no murmurs, rubs or gallops  Abdominal: Soft, NT, ND +BS,   Extremities: No edema, + peripheral pulses  Skin: No Rashes, Hematoma, Ecchymosis  Lymphatic: No Neck, axilla, groin LAD  Psych: Oriented x 3, normal affect  Incisions: c/d/i   Tubes:

## 2023-04-12 NOTE — DISCHARGE NOTE PROVIDER - NSDCMRMEDTOKEN_GEN_ALL_CORE_FT
clomiPRAMINE 25 mg oral capsule: 1 orally  cyclobenzaprine 10 mg oral tablet: 1 orally  LORazepam 0.5 mg oral tablet: 1 tab(s) orally once a day   Chest Xray PA/Lateral: Please fax results to Dr Kam [phone]  clomiPRAMINE 25 mg oral capsule: 1 orally  cyclobenzaprine 10 mg oral tablet: 1 orally  oxyCODONE 5 mg oral tablet: 1 tab(s) orally every 4 hours as needed for  chest pain MDD: 6 tabs

## 2023-04-12 NOTE — PROGRESS NOTE ADULT - SUBJECTIVE AND OBJECTIVE BOX
CHIEF COMPLAINT: FOLLOW UP IN ICU FOR POSTOPERATIVE CARE OF PATIENT WHO IS S/P Ellis procedure      PROCEDURES:     Bilateral VATS, Ellis Procedure. 11-Apr-2023      ISSUES:     Pectus Excavatum  Postoperative pain  Small left apical pneumothorax    INTERVAL EVENTS:   OR today. Extubated in OR. Transferred to CTICU.      HISTORY:   Patient reports moderate pain at chest wall incision sites which is worse with coughing and deep breathing without associated fever or dyspnea. Pain is improved with use of pain meds.     PHYSICAL EXAM:   Gen: Comfortable, No acute distress  Eyes: Sclera white, Conjunctiva normal, Eyelids normal, Pupils symmetrical   ENT: Mucous membranes moist,  ,  ,    Neck: Trachea midline,  ,  ,  ,  ,  ,    CV: Rate regular, Rhythm regular,  ,  ,    Resp: Breath sounds clear, No accessory muscles use,   Abd: Soft, Non-distended, Non-tender, Bowel sounds normal,  ,  ,    Skin: Warm, No peripheral edema of lower extremities,  ,    : No galvan  Neuro: Moving all 4 extremities,    Psych: A&Ox3      ASSESSMENT AND PLAN:     NEURO:  Post-operative Pain - Stable. Pain control with toradol, Tylenol IV PRN, dilaudid PRN         RESPIRATORY:     Pectus excavatum- s/p Ellis procedure.   Small left ptx- will continue to monitor. Spo2 maintained on RA.       CARDIOVASCULAR:  Hemodynamically stable - Not on pressors. Continue hemodynamic monitoring.  Telemetry (medical test) - Reviewed by me today independently. Normal sinus rhythm.        RENAL:  Stable - Monitor IOs and electrolytes. Keep K above 4.0 and Mg above 2.0.          GASTROINTESTINAL:  GI prophylaxis not indicated  Zofran and Reglan IV PRN for nausea  Regular consistency diet          HEMATOLOGIC:  No signs of active bleeding. Monitor Hgb in CBC in AM  DVT prophylaxis with heparin subQ and SCDs.        INFECTIOUS DISEASE:  All surgical sites appear clean. No signs of active infection. Will monitor for fever and leukocytosis.        ENDOCRINE:  Stable – Monitor glucose fingersticks for goal 120-180.        Pertinent clinical, laboratory, radiographic, hemodynamic, echocardiographic, respiratory data, microbiologic data and chart were reviewed by myself and analyzed frequently throughout the course of the day and night by myself.    Plan discussed at length with the CTICU staff and Attending CT Surgeon -   Dr Mikey Kam.    Patient's status was discussed with patient at bedside.     ________________________________________________      _________________________  VITAL SIGNS:  Vital Signs Last 24 Hrs  T(C): 37.1 (11 Apr 2023 12:21), Max: 37.1 (11 Apr 2023 12:21)  T(F): 98.8 (11 Apr 2023 12:21), Max: 98.8 (11 Apr 2023 12:21)  HR: 90 (11 Apr 2023 12:21) (90 - 90)  BP: 132/97 (11 Apr 2023 12:21) (132/97 - 132/97)  BP(mean): --  RR: 17 (11 Apr 2023 12:21) (17 - 17)  SpO2: 100% (11 Apr 2023 12:21) (100% - 100%)    Parameters below as of 11 Apr 2023 12:21  Patient On (Oxygen Delivery Method): room air      I/Os:   I&O's Detail          MEDICATIONS:  MEDICATIONS  (STANDING):  heparin   Injectable 5000 Unit(s) SubCutaneous every 8 hours  morphine PF Spinal 0.3 milliGRAM(s) IntraThecal. once  polyethylene glycol 3350 17 Gram(s) Oral daily  senna 2 Tablet(s) Oral at bedtime    MEDICATIONS  (PRN):  cyclobenzaprine 10 milliGRAM(s) Oral three times a day PRN Muscle Spasm  HYDROmorphone  Injectable 1 milliGRAM(s) IV Push every 3 hours PRN Severe Pain (7 - 10)  HYDROmorphone  Injectable 0.5 milliGRAM(s) IV Push every 10 minutes PRN Moderate Pain (4 - 6)  HYDROmorphone  Injectable 1 milliGRAM(s) IV Push every 10 minutes PRN Severe Pain (7 - 10)  LORazepam     Tablet 0.5 milliGRAM(s) Oral daily PRN Agitation  naloxone Injectable 0.1 milliGRAM(s) IV Push every 3 minutes PRN For ANY of the following changes in patient status:  A. RR LESS THAN 10 breaths per minute, B. Oxygen saturation LESS THAN 90%, C. Sedation score of 6  ondansetron Injectable 4 milliGRAM(s) IV Push every 6 hours PRN Nausea  ondansetron Injectable 4 milliGRAM(s) IV Push once PRN Nausea and/or Vomiting  oxyCODONE    IR 5 milliGRAM(s) Oral every 3 hours PRN Mild Pain (1 - 3)  oxyCODONE    IR 10 milliGRAM(s) Oral every 3 hours PRN Moderate Pain (4 - 6)      LABS:  Laboratory data was independently reviewed by me today.                       RADIOLOGY:   Radiology images were independently reviewed by me today. Reports were reviewed by me today.    
Anesthesia Pain Management Service    SUBJECTIVE: Patient s/p spinal morphine with pain managable and no problems. Denies headache, numbness and tingling in bilateral lower extremities, pruritus, nausea, vomiting. Patient felt good when ambulating and tolerated breakfast.  Pain Scale Score: 3/10 Refer to charted pain scores    THERAPY:    s/p spinal PF morphine yesterday.      MEDICATIONS  (STANDING):  heparin   Injectable 5000 Unit(s) SubCutaneous every 8 hours  polyethylene glycol 3350 17 Gram(s) Oral daily  senna 2 Tablet(s) Oral at bedtime    MEDICATIONS  (PRN):  cyclobenzaprine 10 milliGRAM(s) Oral three times a day PRN Muscle Spasm  HYDROmorphone  Injectable 1 milliGRAM(s) IV Push every 3 hours PRN Severe Pain (7 - 10)  HYDROmorphone  Injectable 0.5 milliGRAM(s) IV Push every 10 minutes PRN Moderate Pain (4 - 6)  HYDROmorphone  Injectable 1 milliGRAM(s) IV Push every 10 minutes PRN Severe Pain (7 - 10)  LORazepam     Tablet 0.5 milliGRAM(s) Oral daily PRN Agitation  naloxone Injectable 0.1 milliGRAM(s) IV Push every 3 minutes PRN For ANY of the following changes in patient status:  A. RR LESS THAN 10 breaths per minute, B. Oxygen saturation LESS THAN 90%, C. Sedation score of 6  ondansetron Injectable 4 milliGRAM(s) IV Push every 6 hours PRN Nausea  oxyCODONE    IR 5 milliGRAM(s) Oral every 3 hours PRN Mild Pain (1 - 3)  oxyCODONE    IR 10 milliGRAM(s) Oral every 3 hours PRN Moderate Pain (4 - 6)      OBJECTIVE: Patient sitting up in chair.    Sedation Score:	[ x] Alert	[ ] Drowsy	[ ] Arousable	[ ] Asleep	[ ] Unresponsive    Side Effects:	[ x] None	[ ] Nausea	[ ] Vomiting	[ ] Pruritus  		  [ ] Weakness		[ ] Numbness	[ ] Other:    Vital Signs Last 24 Hrs  T(C): 36.8 (12 Apr 2023 08:00), Max: 37.1 (11 Apr 2023 12:21)  T(F): 98.3 (12 Apr 2023 08:00), Max: 98.8 (11 Apr 2023 12:21)  HR: 73 (12 Apr 2023 09:00) (69 - 103)  BP: 126/90 (12 Apr 2023 09:00) (108/69 - 147/95)  BP(mean): 100 (12 Apr 2023 09:00) (82 - 110)  RR: 16 (12 Apr 2023 09:00) (10 - 23)  SpO2: 100% (12 Apr 2023 09:00) (95% - 100%)    Parameters below as of 12 Apr 2023 10:00  Patient On (Oxygen Delivery Method): room air        ASSESSMENT/ PLAN  [x ] Patient transitioned to prn analgesics  [x] Pain management per primary service, pain service to sign off   [x]Documentation and Verification of current medications     Comments: PRN Oral/IV opioids and/or non-opioid Adjuvant analgesics to be used at this point.    Progress Note written now but Patient was seen earlier.
JOYCE LIEBERMAN      38y   Male   MRN-[id number]         No Known Allergies             Daily Height in cm: 170.18 (11 Apr 2023 12:21)    Daily Drug Dosing Weight  Height (cm): 170.2 (11 Apr 2023 12:21)  Weight (kg): 59.9 (11 Apr 2023 12:21)  BMI (kg/m2): 20.7 (11 Apr 2023 12:21)  BSA (m2): 1.7 (11 Apr 2023 12:21)    29 year old male presents for PST prior to Septoplasty - Outfracture & Submucous resection turbinates on 8/21/14 secondary to Deviated Nasal Septum - pt notes chronic nasal congestion - denies chronic sinusitis - notes " flow in left nostril doesn't feel correct." Notes CT of sinuses done in Dr. Norton office ([place] Office)      CHIEF COMPLAINT: Follow up in ICU  for postoperative care of patient who is s/p     Procedure:  Bilateral VATS, Ellis Procedure. 11-Apr-2023                         Issues:              Pectus Excavatum  Postoperative pain  Small left apical pneumothorax  Anxiety  Peripheral neuropathy      Postop course:     Patient reports moderate pain at chest wall incision sites which is worse with coughing and deep breathing without associated fever or dyspnea. Pain is improved with use of PCA and  oral pain meds.         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 - MSG (noted from waist up)      OCD (obsessive compulsive disorder)      Environmental allergies      S/P rhinoplasty      S/P LASIK surgery        Vital Signs Last 24 Hrs  T(C): 36.8 (12 Apr 2023 08:00), Max: 37.1 (11 Apr 2023 12:21)  T(F): 98.3 (12 Apr 2023 08:00), Max: 98.8 (11 Apr 2023 12:21)  HR: 73 (12 Apr 2023 10:00) (69 - 103)  BP: 116/81 (12 Apr 2023 10:00) (108/69 - 147/95)  BP(mean): 93 (12 Apr 2023 10:00) (82 - 110)  RR: 13 (12 Apr 2023 10:00) (10 - 23)  SpO2: 100% (12 Apr 2023 10:00) (95% - 100%)    Parameters below as of 12 Apr 2023 11:00  Patient On (Oxygen Delivery Method): room air      I&O's Detail    11 Apr 2023 07:01  -  12 Apr 2023 07:00  --------------------------------------------------------  IN:    Lactated Ringers: 450 mL    Oral Fluid: 100 mL  Total IN: 550 mL    OUT:  Total OUT: 0 mL    Total NET: 550 mL      12 Apr 2023 07:01  -  12 Apr 2023 10:24  --------------------------------------------------------  IN:    Lactated Ringers: 30 mL    Oral Fluid: 240 mL  Total IN: 270 mL    OUT:    Voided (mL): 100 mL  Total OUT: 100 mL    Total NET: 170 mL        CAPILLARY BLOOD GLUCOSE        Home Medications:  clomiPRAMINE 25 mg oral capsule: 1 orally (11 Apr 2023 15:59)  cyclobenzaprine 10 mg oral tablet: 1 orally (11 Apr 2023 15:59)  LORazepam 0.5 mg oral tablet: 1 tab(s) orally once a day (11 Apr 2023 12:29)    MEDICATIONS  (STANDING):  heparin   Injectable 5000 Unit(s) SubCutaneous every 8 hours  polyethylene glycol 3350 17 Gram(s) Oral daily  senna 2 Tablet(s) Oral at bedtime    MEDICATIONS  (PRN):  cyclobenzaprine 10 milliGRAM(s) Oral three times a day PRN Muscle Spasm  HYDROmorphone  Injectable 1 milliGRAM(s) IV Push every 3 hours PRN Severe Pain (7 - 10)  HYDROmorphone  Injectable 0.5 milliGRAM(s) IV Push every 10 minutes PRN Moderate Pain (4 - 6)  HYDROmorphone  Injectable 1 milliGRAM(s) IV Push every 10 minutes PRN Severe Pain (7 - 10)  LORazepam     Tablet 0.5 milliGRAM(s) Oral daily PRN Agitation  naloxone Injectable 0.1 milliGRAM(s) IV Push every 3 minutes PRN For ANY of the following changes in patient status:  A. RR LESS THAN 10 breaths per minute, B. Oxygen saturation LESS THAN 90%, C. Sedation score of 6  ondansetron Injectable 4 milliGRAM(s) IV Push every 6 hours PRN Nausea  oxyCODONE    IR 5 milliGRAM(s) Oral every 3 hours PRN Mild Pain (1 - 3)  oxyCODONE    IR 10 milliGRAM(s) Oral every 3 hours PRN Moderate Pain (4 - 6)        Physical exam:                             General:               Pt is awake, alert,  appears to be in pain but not in distress                                                  Neuro:                  Nonfocal                             Psych:                   A&Ox3                          Cardiovascular:   S1 & S2, regular                           Respiratory:         Air entry is fair and equal on both sides, clear                          GI:                          Soft, nondistended and nontender, Bowel sounds active                            Ext:                        No cyanosis or edema     Labs:                                                                           12.2   13.60 )-----------( 237      ( 12 Apr 2023 03:10 )             37.2             04-12    137  |  101  |  19  ----------------------------<  107<H>  4.3   |  23  |  0.83    Ca    9.0      12 Apr 2023 03:10  Phos  3.0     04-12  Mg     2.10     04-12                      CXR:  < from: Xray Chest 1 View AP/PA (04.12.23 @ 06:33) >  April 11 at 5:09 PM:  A Ellis bar utilized for pectus excavatum deformity is present.  The lungs are clear, the heart is not enlarged and there is no effusion   or pneumothorax.    April 12 at 5:31 PM:  No interval change. Metallic bar utilized for pectus excavatum repair   again seen in position. Faint visualization of the cavitarylesions seen   on prior exams.    COMPARISON: Chest CT March 16, 2022        IMPRESSION: Status post thoracotomy for pectus excavatum repair.        Plan:  General: 38yMale s/p Bilateral VATS, Ellis Procedure. 11-Apr-2023 , experiencing  pain with deep breathing.                             Neuro:                                         Pain control with Oxy  /  Tylenol PRN                            Cardiovascular:                                          Telemetry (medical test) - Reviewed by me today independently. Normal sinus rhythm.                                          Continue hemodynamic monitoring to prevent decompensation.                            Respiratory:                                         On RA, looks comfortable                                             CXR is clear. Encourage incentive spirometry.                                                   Chest PT and frequent suctioning. Continue bronchodilators, Pulmozyme and inhaled 3% saline inhalations.                                                      OOB to chair & ambulate w/ assistance.                                                           Continuous pulse oximetry for support & to prevent decompensation.                                                                                         GI                                         On  regular diet as tolerated                                         Continue Zofran / Reglan for nausea - PRN                                         Continue bowel regimen	                                                                 Renal:                                         Continue LR  30cc/hr                                         Monitor I/Os and electrolytes                                                                                        Hem/ Onc:                                         DVT prophylaxis with SQ Heparin and SCDs                                                                  Infectious disease:                                            Monitor for fever / leukocytosis.                                          All surgical incision / chest tube  sites look clean                            Endocrine                                             Continue Accu-Checks with coverage                                                   Pertinent clinical, laboratory, radiographic, hemodynamic, echocardiographic, respiratory data, microbiologic data and chart were reviewed and analyzed frequently throughout the course of the day and night.     Patient seen, examined and plan discussed with CT Surgeon Dr. Kam   / CTICU team during rounds.    OOB to chair and ambulate with physical therapy as tolerated.     Status discussed with patient and updated plan of care.     I have spent 40 minutes with this patient including 20 minutes of time coordinating care in the ICU.          Marty Rodriges MD

## 2023-04-12 NOTE — DISCHARGE NOTE PROVIDER - CARE PROVIDER_API CALL
Mikey Kam (MD)  Surgery; Thoracic Surgery  [phone] [place] [address] [place] [address]  Phone: [phone]  Fax: [phone]  Follow Up Time: 1 week

## 2023-04-20 ENCOUNTER — APPOINTMENT (OUTPATIENT)
Dept: THORACIC SURGERY | Facility: CLINIC | Age: 38
End: 2023-04-20
Payer: MEDICAID

## 2023-04-20 VITALS
SYSTOLIC BLOOD PRESSURE: 133 MMHG | RESPIRATION RATE: 18 BRPM | OXYGEN SATURATION: 98 % | BODY MASS INDEX: 20.88 KG/M2 | HEART RATE: 100 BPM | DIASTOLIC BLOOD PRESSURE: 85 MMHG | HEIGHT: 67 IN | WEIGHT: 133 LBS

## 2023-04-20 PROCEDURE — 99024 POSTOP FOLLOW-UP VISIT: CPT

## 2023-04-20 RX ORDER — CYCLOBENZAPRINE HYDROCHLORIDE 10 MG/1
10 TABLET, FILM COATED ORAL
Refills: 0 | Status: ACTIVE | COMMUNITY

## 2023-04-20 RX ORDER — CLONAZEPAM 0.5 MG/1
0.5 TABLET ORAL
Refills: 0 | Status: ACTIVE | COMMUNITY

## 2023-04-20 NOTE — COUNSELING
[Importance of Regular Medical Follow-Up] : the importance of regular medical follow-up [Hygeine (Including Daily Shower)] : hygeine (including daily shower) [No Heavy Lifting] : no heavy lifting (>15-20 lb. for 1 month or 25 lb. for 3 months from date of surgery) [Blood Pressure Control] : blood pressure control [S/S of infection] : signs and symptoms of infection (and to whom it should be reported) [Progressive Ambulation/Activity] : progressive ambulation/activity [Medication/Vitamin/Herb/Food Interaction] : medication/vitamin/herb/food interaction

## 2023-04-20 NOTE — DISCUSSION/SUMMARY
[Doing Well] : is doing well [Excellent Pain Control] : has excellent pain control [No Sign of Infection] : is showing no signs of infection [2] : 2 [Remove Sutures/Staples] : removed sutures/staples [Clinical Recheck] : clinical recheck

## 2023-04-21 NOTE — CONSULT LETTER
[Dear  ___] : Dear  [unfilled], [Consult Letter:] : I had the pleasure of evaluating your patient, [unfilled]. [( Thank you for referring [unfilled] for consultation for _____ )] : Thank you for referring [unfilled] for consultation for [unfilled] [Please see my note below.] : Please see my note below. [Consult Closing:] : Thank you very much for allowing me to participate in the care of this patient.  If you have any questions, please do not hesitate to contact me. [Sincerely,] : Sincerely, [FreeTextEntry2] : Dr. Norman Cheek (ref) [FreeTextEntry3] : Mikey Kam MD, MPH \par System Director of Thoracic Surgery \par Director of Comprehensive Lung and Foregut [place] \par Professor Cardiovascular & Thoracic Surgery  \par [facility] [employer] at [facility]\par

## 2023-04-21 NOTE — ASSESSMENT
[FreeTextEntry1] : Mr. JOYCE LIEBERMAN, 38 year old male, never smoker, w/ hx of pectus excavatum since age of 8, he is referred by his PCP for evaluation of pectus excavatum. \par \par CT Chest on 9/21/21:\par - pectus excavatum is noted, Sb index is 26.8 cm/6.6cm=4.06\par - tree-in-bud opacities in upper lobes bilaterally with small irregular shaped cystic cavities in bilateral upper lobes\par \par Pt initial seen in office 9/30/2021, referred by Dr. Norman Cheek. Chest deformity noted, pt was concerned about appearance, surgical option including Ellis procedure and Ellis bar placement were explained to pt in detail. He would like to complete workup (PFTs and ECHO) and think about it. Pt has lost f/u since then.\par \par PFT 10/27/2021: FVC 80%, FEV1: 66%, DLCO:68%\par \par CT Chest 3/16/2022:\par -similar upper lobe cavitary lesions with surrounding nodularity, similar to prior CT scan\par -B/L lower lobe peripheral nodularity/scarring in the same location of nodular consolidations seen on prior CTs\par \par ECHO from Oct 2021 by Dr. Brooks Francisco showed diastolic dysfunction; pulm insufficiency and trace tricuspid insufficiency.\par \par Now s/p Bilateral VATS, Ellis procedure on 4/11/23. \par \par CXR today---- reviewed, Pt is doing well, denies severe pain, SOB, or CP. \par \par I have reviewed the patient's medical records and diagnostic images at time of this office consultation and have made the following recommendation:\par 1. CXR reviewed, pt is doing well, RTC in 3 mons with CXR \par \par \par I, ERIKA Paz, personally performed the evaluation and management (E/M) services for this established patient who presents today with (a) new problem(s)/exacerbation of (an) existing condition(s).  That E/M includes conducting the examination, assessing all new/exacerbated conditions, and establishing a new plan of care.  Today, my ACP, Glenys Akbar, NP was here to observe my evaluation and management services for this new problem/exacerbated condition to be followed going forward.\par

## 2023-05-03 ENCOUNTER — NON-APPOINTMENT (OUTPATIENT)
Age: 38
End: 2023-05-03

## 2023-05-11 ENCOUNTER — APPOINTMENT (OUTPATIENT)
Dept: THORACIC SURGERY | Facility: CLINIC | Age: 38
End: 2023-05-11
Payer: MEDICAID

## 2023-05-11 VITALS
HEART RATE: 94 BPM | BODY MASS INDEX: 20.72 KG/M2 | DIASTOLIC BLOOD PRESSURE: 92 MMHG | WEIGHT: 132 LBS | SYSTOLIC BLOOD PRESSURE: 134 MMHG | OXYGEN SATURATION: 99 % | HEIGHT: 67 IN

## 2023-05-11 DIAGNOSIS — T81.49XA INFECTION FOLLOWING A PROCEDURE, OTHER SURGICAL SITE, INITIAL ENCOUNTER: ICD-10-CM

## 2023-05-11 PROCEDURE — 99024 POSTOP FOLLOW-UP VISIT: CPT

## 2023-05-11 NOTE — COUNSELING
[Hygeine (Including Daily Shower)] : hygeine (including daily shower) [Importance of Regular Medical Follow-Up] : the importance of regular medical follow-up [No Heavy Lifting] : no heavy lifting (>15-20 lb. for 1 month or 25 lb. for 3 months from date of surgery) [Blood Pressure Control] : blood pressure control [S/S of infection] : signs and symptoms of infection (and to whom it should be reported) [Progressive Ambulation/Activity] : progressive ambulation/activity [Medication/Vitamin/Herb/Food Interaction] : medication/vitamin/herb/food interaction all other ROS negative except as per HPI

## 2023-05-11 NOTE — DISCUSSION/SUMMARY
[Doing Well] : is doing well [Excellent Pain Control] : has excellent pain control [Showing Signs of Infection] : is showing signs of an infection [3] : 3 [Dressing Change] : dressing was changed

## 2023-05-12 NOTE — CONSULT LETTER
[Dear  ___] : Dear  [unfilled], [Consult Letter:] : I had the pleasure of evaluating your patient, [unfilled]. [( Thank you for referring [unfilled] for consultation for _____ )] : Thank you for referring [unfilled] for consultation for [unfilled] [Please see my note below.] : Please see my note below. [Consult Closing:] : Thank you very much for allowing me to participate in the care of this patient.  If you have any questions, please do not hesitate to contact me. [Sincerely,] : Sincerely, [FreeTextEntry2] : Dr. Norman Cheek (ref) [FreeTextEntry3] : Mikey Kam MD, MPH \par System Director of Thoracic Surgery \par Director of Comprehensive Lung and Foregut [place] \par Professor Cardiovascular & Thoracic Surgery  \par [facility] [employer] at [facility]\par

## 2023-05-12 NOTE — PHYSICAL EXAM
[] : no respiratory distress [Auscultation Breath Sounds / Voice Sounds] : lungs were clear to auscultation bilaterally [Heart Rate And Rhythm] : heart rate was normal and rhythm regular [Heart Sounds] : normal S1 and S2 [Heart Sounds Gallop] : no gallops [Murmurs] : no murmurs [Heart Sounds Pericardial Friction Rub] : no pericardial rub [FreeTextEntry2] : minimal amount of pus noted to Rt VATS incision

## 2023-05-12 NOTE — ASSESSMENT
[FreeTextEntry1] : Mr. JOYCE LIEBERMAN, 38 year old male, never smoker, w/ hx of pectus excavatum since age of 8, he is referred by his PCP for evaluation of pectus excavatum. \par \par CT Chest on 9/21/21:\par - pectus excavatum is noted, Sb index is 26.8 cm/6.6cm=4.06\par - tree-in-bud opacities in upper lobes bilaterally with small irregular shaped cystic cavities in bilateral upper lobes\par \par Pt initial seen in office 9/30/2021, referred by Dr. Norman Cheek. Chest deformity noted, pt was concerned about appearance, surgical option including Ellis procedure and Ellis bar placement were explained to pt in detail. He would like to complete workup (PFTs and ECHO) and think about it. Pt has lost f/u since then.\par \par PFT 10/27/2021: FVC 80%, FEV1: 66%, DLCO:68%\par \par CT Chest 3/16/2022:\par -similar upper lobe cavitary lesions with surrounding nodularity, similar to prior CT scan\par -B/L lower lobe peripheral nodularity/scarring in the same location of nodular consolidations seen on prior CTs\par \par ECHO from Oct 2021 by Dr. Brooks Francisco showed diastolic dysfunction; pulm insufficiency and trace tricuspid insufficiency.\par \par Now s/p Bilateral VATS, Ellis procedure on 4/11/23. \par \par Pt presents today for follow up, c/o infection of incision. \par \par I have reviewed the patient's medical records and diagnostic images at time of this office consultation and have made the following recommendation:\par 1. To take Augmentin 500mg PO BID x 10 days\par 2. RTC in 2 wks for wound check.\par \par \par I, ERIKA Paz, personally performed the evaluation and management (E/M) services for this established patient who presents today with (a) new problem(s)/exacerbation of (an) existing condition(s). That E/M includes conducting the examination, assessing all new/exacerbated conditions, and establishing a new plan of care. Today, my ACP, Guzman Fuentes NP was here to observe my evaluation and management services for this new problem/exacerbated condition to be followed going forward.\par \par \par

## 2023-05-25 ENCOUNTER — APPOINTMENT (OUTPATIENT)
Dept: THORACIC SURGERY | Facility: CLINIC | Age: 38
End: 2023-05-25
Payer: MEDICAID

## 2023-05-25 VITALS
WEIGHT: 132 LBS | SYSTOLIC BLOOD PRESSURE: 143 MMHG | OXYGEN SATURATION: 99 % | BODY MASS INDEX: 20.72 KG/M2 | HEIGHT: 67 IN | DIASTOLIC BLOOD PRESSURE: 98 MMHG | HEART RATE: 95 BPM

## 2023-05-25 PROCEDURE — 99024 POSTOP FOLLOW-UP VISIT: CPT

## 2023-05-25 RX ORDER — AMOXICILLIN AND CLAVULANATE POTASSIUM 500; 125 MG/1; MG/1
500-125 TABLET, FILM COATED ORAL
Qty: 20 | Refills: 0 | Status: COMPLETED | COMMUNITY
Start: 2023-05-11 | End: 2023-05-25

## 2023-05-25 NOTE — ASSESSMENT
[FreeTextEntry1] : Mr. JOYCE LIEBERMAN, 38 year old male, never smoker, w/ hx of pectus excavatum since age of 8, he is referred by his PCP for evaluation of pectus excavatum. \par \par CT Chest on 9/21/21:\par - pectus excavatum is noted, Sb index is 26.8 cm/6.6cm=4.06\par - tree-in-bud opacities in upper lobes bilaterally with small irregular shaped cystic cavities in bilateral upper lobes\par \par Pt initial seen in office 9/30/2021, referred by Dr. Norman Cheek. Chest deformity noted, pt was concerned about appearance, surgical option including Ellis procedure and Ellis bar placement were explained to pt in detail. He would like to complete workup (PFTs and ECHO) and think about it. Pt has lost f/u since then.\par \par PFT 10/27/2021: FVC 80%, FEV1: 66%, DLCO:68%\par \par CT Chest 3/16/2022:\par -similar upper lobe cavitary lesions with surrounding nodularity, similar to prior CT scan\par -B/L lower lobe peripheral nodularity/scarring in the same location of nodular consolidations seen on prior CTs\par \par ECHO from Oct 2021 by Dr. Brooks Francisco showed diastolic dysfunction; pulm insufficiency and trace tricuspid insufficiency.\par \par Now s/p Bilateral VATS, Ellis procedure on 4/11/23. \par \par Last visit, c/o infection of incision. Augmentin 500 mg PO BID x 10 days. \par \par Pt presents today for wound check. Patient c/o discomfort at the middle chest when he is stretching. \par \par I have reviewed the patient's medical records and diagnostic images at time of this office consultation and have made the following recommendation:\par 1. Incision site, no sign of infection. Patient is doing well, recommended patient to RTC in 6 months with CXR. \par \par I, ERIKA Paz, personally performed the evaluation and management (E/M) services for this established patient who follow up today with an existing condition.  That E/M includes conducting the examination, assessing all new/exacerbated/existing conditions, and establishing a plan of care.  Today, my ACP, Gwendolyn Jones ANP-C, was here to observe my evaluation and management services for this existing condition to be followed going forward.

## 2023-05-25 NOTE — PHYSICAL EXAM
[] : no respiratory distress [Auscultation Breath Sounds / Voice Sounds] : lungs were clear to auscultation bilaterally [Heart Rate And Rhythm] : heart rate was normal and rhythm regular [Heart Sounds Gallop] : no gallops [Heart Sounds] : normal S1 and S2 [Murmurs] : no murmurs [Heart Sounds Pericardial Friction Rub] : no pericardial rub [Clean] : clean [Dry] : dry [Healing Well] : healing well [No Edema] : no edema [Bleeding] : no active bleeding [Foul Odor] : no foul smell [Purulent Drainage] : no purulent drainage [Serosanguinous Drainage] : no serosanguinous drainage [Erythema] : not erythematous [Warm] : not warm [Tender] : not tender

## 2023-05-25 NOTE — CONSULT LETTER
[Dear  ___] : Dear  [unfilled], [Consult Letter:] : I had the pleasure of evaluating your patient, [unfilled]. [( Thank you for referring [unfilled] for consultation for _____ )] : Thank you for referring [unfilled] for consultation for [unfilled] [Please see my note below.] : Please see my note below. [Consult Closing:] : Thank you very much for allowing me to participate in the care of this patient.  If you have any questions, please do not hesitate to contact me. [Sincerely,] : Sincerely, [FreeTextEntry2] : Dr. Norman Cheek (ref) [FreeTextEntry3] : Mikey Kam MD, MPH \par System Director of Thoracic Surgery \par Director of Comprehensive Lung and Foregut [place] \par Professor Cardiovascular & Thoracic Surgery  \par [facility] [employer] [facility]\par

## 2023-07-06 ENCOUNTER — OUTPATIENT (OUTPATIENT)
Dept: OUTPATIENT SERVICES | Facility: HOSPITAL | Age: 38
LOS: 1 days | End: 2023-07-06
Payer: MEDICAID

## 2023-07-06 ENCOUNTER — APPOINTMENT (OUTPATIENT)
Dept: THORACIC SURGERY | Facility: CLINIC | Age: 38
End: 2023-07-06
Payer: MEDICAID

## 2023-07-06 ENCOUNTER — APPOINTMENT (OUTPATIENT)
Dept: RADIOLOGY | Facility: HOSPITAL | Age: 38
End: 2023-07-06

## 2023-07-06 VITALS
HEIGHT: 67 IN | SYSTOLIC BLOOD PRESSURE: 125 MMHG | HEART RATE: 95 BPM | BODY MASS INDEX: 20.72 KG/M2 | WEIGHT: 132 LBS | OXYGEN SATURATION: 100 % | DIASTOLIC BLOOD PRESSURE: 83 MMHG | TEMPERATURE: 99.3 F

## 2023-07-06 DIAGNOSIS — Z98.890 OTHER SPECIFIED POSTPROCEDURAL STATES: Chronic | ICD-10-CM

## 2023-07-06 DIAGNOSIS — J98.4 OTHER DISORDERS OF LUNG: ICD-10-CM

## 2023-07-06 PROCEDURE — 99024 POSTOP FOLLOW-UP VISIT: CPT

## 2023-07-06 PROCEDURE — 71046 X-RAY EXAM CHEST 2 VIEWS: CPT | Mod: 26

## 2023-07-07 RX ORDER — CLOMIPRAMINE HYDROCHLORIDE 25 MG/1
25 CAPSULE ORAL
Refills: 0 | Status: COMPLETED | COMMUNITY
End: 2023-07-07

## 2023-07-07 RX ORDER — INDOMETHACIN 50 MG/1
CAPSULE ORAL
Refills: 0 | Status: COMPLETED | COMMUNITY
End: 2023-07-07

## 2023-07-07 RX ORDER — PREGABALIN 75 MG/1
75 CAPSULE ORAL
Qty: 60 | Refills: 0 | Status: ACTIVE | COMMUNITY
Start: 2023-06-09

## 2023-07-07 NOTE — CONSULT LETTER
[Dear  ___] : Dear  [unfilled], [Consult Letter:] : I had the pleasure of evaluating your patient, [unfilled]. [( Thank you for referring [unfilled] for consultation for _____ )] : Thank you for referring [unfilled] for consultation for [unfilled] [Please see my note below.] : Please see my note below. [Consult Closing:] : Thank you very much for allowing me to participate in the care of this patient.  If you have any questions, please do not hesitate to contact me. [Sincerely,] : Sincerely, [FreeTextEntry2] : Dr. Norman Cheek (ref) [FreeTextEntry3] : Mikey Kam MD, MPH \par System Director of Thoracic Surgery \par Director of Comprehensive Lung and Foregut [place] \par Professor Cardiovascular & Thoracic Surgery  \par [facility] [employer] at [facility]\par

## 2023-07-07 NOTE — ASSESSMENT
[FreeTextEntry1] : Mr. JOYCE LIEBERMAN, 38 year old male, never smoker, w/ hx of pectus excavatum since age of 8, he is referred by his PCP for evaluation of pectus excavatum. \par \par CT Chest on 9/21/21:\par - pectus excavatum is noted, Sb index is 26.8 cm/6.6cm=4.06\par - tree-in-bud opacities in upper lobes bilaterally with small irregular shaped cystic cavities in bilateral upper lobes\par \par Pt initial seen in office 9/30/2021, referred by Dr. Norman Cheek. Chest deformity noted, pt was concerned about appearance, surgical option including Ellis procedure and Ellis bar placement were explained to pt in detail. He would like to complete workup (PFTs and ECHO) and think about it. Pt has lost f/u since then.\par \par PFT 10/27/2021: FVC 80%, FEV1: 66%, DLCO:68%\par \par CT Chest 3/16/2022:\par -similar upper lobe cavitary lesions with surrounding nodularity, similar to prior CT scan\par -B/L lower lobe peripheral nodularity/scarring in the same location of nodular consolidations seen on prior CTs\par \par ECHO from Oct 2021 by Dr. Brooks Francisco showed diastolic dysfunction; pulm insufficiency and trace tricuspid insufficiency.\par \par Now ~3 mo s/p Bilateral VATS, Ellis procedure on 4/11/23. \par \par 5/11/23: c/o infection of incision. Augmentin 500 mg PO BID x 10 days. \par \par 5/25/23: Patient c/o discomfort at the middle chest when he is stretching, however, Incision site had no sign of infection, recommended patient to RTC end of Nov with CXR. \par \par I have reviewed the patient's medical records and diagnostic images at time of this office consultation and have made the following recommendation:\par 1. CXR reviewed, pt admits to soreness and tightness. RTC in Nov with CXR. \par \par \par I, ERIKA Paz, personally performed the evaluation and management (E/M) services for this established patient who presents today with (a) new problem(s)/exacerbation of (an) existing condition(s).  That E/M includes conducting the examination, assessing all new/exacerbated conditions, and establishing a new plan of care.  Today, my ACP, Glenys Webber NP was here to observe my evaluation and management services for this new problem/exacerbated condition to be followed going forward.\par

## 2023-07-20 ENCOUNTER — APPOINTMENT (OUTPATIENT)
Dept: THORACIC SURGERY | Facility: CLINIC | Age: 38
End: 2023-07-20

## 2023-11-16 ENCOUNTER — RESULT REVIEW (OUTPATIENT)
Age: 38
End: 2023-11-16

## 2023-11-16 ENCOUNTER — APPOINTMENT (OUTPATIENT)
Dept: THORACIC SURGERY | Facility: CLINIC | Age: 38
End: 2023-11-16
Payer: MEDICAID

## 2023-11-16 ENCOUNTER — APPOINTMENT (OUTPATIENT)
Dept: RADIOLOGY | Facility: HOSPITAL | Age: 38
End: 2023-11-16

## 2023-11-16 ENCOUNTER — OUTPATIENT (OUTPATIENT)
Dept: OUTPATIENT SERVICES | Facility: HOSPITAL | Age: 38
LOS: 1 days | End: 2023-11-16
Payer: MEDICAID

## 2023-11-16 VITALS
RESPIRATION RATE: 17 BRPM | DIASTOLIC BLOOD PRESSURE: 97 MMHG | OXYGEN SATURATION: 99 % | BODY MASS INDEX: 20.72 KG/M2 | SYSTOLIC BLOOD PRESSURE: 144 MMHG | HEART RATE: 96 BPM | WEIGHT: 132 LBS | HEIGHT: 67 IN

## 2023-11-16 DIAGNOSIS — Z98.890 OTHER SPECIFIED POSTPROCEDURAL STATES: Chronic | ICD-10-CM

## 2023-11-16 DIAGNOSIS — J98.4 OTHER DISORDERS OF LUNG: ICD-10-CM

## 2023-11-16 DIAGNOSIS — Q67.6 PECTUS EXCAVATUM: ICD-10-CM

## 2023-11-16 PROCEDURE — 99213 OFFICE O/P EST LOW 20 MIN: CPT

## 2023-11-16 PROCEDURE — 71046 X-RAY EXAM CHEST 2 VIEWS: CPT | Mod: 26

## 2023-11-16 RX ORDER — PANTOPRAZOLE SODIUM 40 MG/1
40 GRANULE, DELAYED RELEASE ORAL
Refills: 0 | Status: ACTIVE | COMMUNITY

## 2024-01-03 NOTE — CONSULT LETTER
[Dear  ___] : Dear  [unfilled], [Consult Letter:] : I had the pleasure of evaluating your patient, [unfilled]. [( Thank you for referring [unfilled] for consultation for _____ )] : Thank you for referring [unfilled] for consultation for [unfilled] [Please see my note below.] : Please see my note below. [Consult Closing:] : Thank you very much for allowing me to participate in the care of this patient.  If you have any questions, please do not hesitate to contact me. [Sincerely,] : Sincerely, [FreeTextEntry2] : Dr. Mikey Kam [FreeTextEntry3] : Salud Clarke MD\par  of Medicine\par Associate , Fellowship in Pulmonary and Critical Care Medicine\par Division of Pulmonary, Critical Care & Sleep Medicine\par [employer] at [facility]\par \par \par  generalized weakness/dizziness

## 2024-04-10 ENCOUNTER — NON-APPOINTMENT (OUTPATIENT)
Age: 39
End: 2024-04-10

## 2024-10-02 ENCOUNTER — APPOINTMENT (OUTPATIENT)
Dept: ORTHOPEDIC SURGERY | Facility: CLINIC | Age: 39
End: 2024-10-02
Payer: COMMERCIAL

## 2024-10-02 VITALS — HEIGHT: 67 IN | WEIGHT: 125 LBS | BODY MASS INDEX: 19.62 KG/M2

## 2024-10-02 DIAGNOSIS — M76.52 PATELLAR TENDINITIS, LEFT KNEE: ICD-10-CM

## 2024-10-02 DIAGNOSIS — M22.2X2 PATELLOFEMORAL DISORDERS, LEFT KNEE: ICD-10-CM

## 2024-10-02 PROCEDURE — 99204 OFFICE O/P NEW MOD 45 MIN: CPT

## 2024-10-02 PROCEDURE — 73564 X-RAY EXAM KNEE 4 OR MORE: CPT | Mod: LT

## 2024-10-02 NOTE — PHYSICAL EXAM
[Left] : left knee [NL (140)] : flexion 140 degrees [NL (0)] : extension 0 degrees [5___] : hamstring 5[unfilled]/5 [Negative] : negative Ngozi's [] : patient ambulates without assistive device

## 2024-10-02 NOTE — HISTORY OF PRESENT ILLNESS
[de-identified] : 10/02/2024 Mr. JOYCE LIEBERMAN, a 39 year old male, presents today for chronic left knee pain since 2010. Initial injury was a fall directly onto the knee onto a concrete surface. Knee pain worsening since 9/20/24 with no new MALI. Pain is anterior. Pain is intermittent/random and he has not been able to pinpoint what causes pain. Has tried HEP and diclofenac in the past with no relief. Reports that pain has improved over the past few days.

## 2024-10-02 NOTE — DISCUSSION/SUMMARY
[de-identified] : 39m with left knee patellar tendinitis and pfs. Has seen improvement over the past few days.  1) start physical therapy 2) cryotherapy, rest and activity modification  3) rtc prn   Entered by Lynn Kilgore acting as scribe. Dr. Billings- The documentation recorded by the scribe accurately reflects the service I personally performed and the decisions made by me.

## 2024-11-07 ENCOUNTER — OUTPATIENT (OUTPATIENT)
Dept: OUTPATIENT SERVICES | Facility: HOSPITAL | Age: 39
LOS: 1 days | End: 2024-11-07
Payer: COMMERCIAL

## 2024-11-07 ENCOUNTER — APPOINTMENT (OUTPATIENT)
Dept: THORACIC SURGERY | Facility: CLINIC | Age: 39
End: 2024-11-07
Payer: COMMERCIAL

## 2024-11-07 ENCOUNTER — RESULT REVIEW (OUTPATIENT)
Age: 39
End: 2024-11-07

## 2024-11-07 ENCOUNTER — APPOINTMENT (OUTPATIENT)
Dept: RADIOLOGY | Facility: HOSPITAL | Age: 39
End: 2024-11-07

## 2024-11-07 VITALS
OXYGEN SATURATION: 99 % | WEIGHT: 125 LBS | HEART RATE: 92 BPM | DIASTOLIC BLOOD PRESSURE: 87 MMHG | BODY MASS INDEX: 19.62 KG/M2 | SYSTOLIC BLOOD PRESSURE: 132 MMHG | RESPIRATION RATE: 17 BRPM | HEIGHT: 67 IN

## 2024-11-07 DIAGNOSIS — Q67.6 PECTUS EXCAVATUM: ICD-10-CM

## 2024-11-07 DIAGNOSIS — Z98.890 OTHER SPECIFIED POSTPROCEDURAL STATES: Chronic | ICD-10-CM

## 2024-11-07 PROCEDURE — 99214 OFFICE O/P EST MOD 30 MIN: CPT

## 2024-11-07 PROCEDURE — 71046 X-RAY EXAM CHEST 2 VIEWS: CPT | Mod: 26

## 2024-11-07 RX ORDER — PANTOPRAZOLE SODIUM 40 MG/1
40 GRANULE, DELAYED RELEASE ORAL
Refills: 0 | Status: ACTIVE | COMMUNITY

## 2025-07-17 NOTE — ASU PATIENT PROFILE, ADULT - NS PRO PT RIGHT SUPPORT PERSON
PROGRESS NOTE    Subjective     Mickey is a 28 year old here for Establish Care (New patient would like to have a PCP ), Back Pain (Has had back pain since high school ), and Foot Pain (Both feet pain )   The patient presents for back pain, foot pain, and cholesteatoma.    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 The condition recurred slightly, necessitating another surgery. He was under the care of an ENT specialist, Dr. Klever Otero, but has not had a follow-up in approximately 8 years. Recently, he has been experiencing symptoms similar to those he had before, including a deep, muffled sound in his ear, akin to having water in the ear. This symptom previously escalated to pain. He is considering returning to Dr. Otero for further evaluation.    He had an allergic reaction to contrast dye about 8 years ago during a CT scan, which caused him to panic due to difficulty breathing. Since then, he has undergone CT scans with contrast, but only after premedication with Benadryl.    He is here for chronic, recurrent pain of his bilateral back and feet. These symptoms have been on and off for years. He is most concerned for his active feet pain, notable of posterior heels to inferior heels. His symptoms are worst first thing in morning and after usage from sitting still. 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 He does not notice worsening with pedal usage. He does find relief with usage of the feet and with shoewear. He has not attempted formal therapy.    His back pain is off the spine on either side. It may be associated with radiation of pain downwards of his legs to feet. Denies weakness, numbness, paresthesia, incontinence.    PAST SURGICAL HISTORY:  Surgical removal of cholesteatoma in ears    Review of Systems      SDOH Some Days Smoker          Objective   Vitals:    07/17/25 0653   BP: 116/70    Pulse: 63   Temp: 96.8 °F (36 °C)   TempSrc: Temporal   SpO2: 98%   Weight: 101.4 kg (223 lb 8 oz)   Height: 6' (1.829 m)   BMI (Calculated): 30.31     Physical Exam  Vitals reviewed.   HENT:      Head: Normocephalic and atraumatic.      Mouth/Throat:      Mouth: Mucous membranes are moist.      Neck: Neck supple.   Eyes:      Conjunctiva/sclera: Conjunctivae normal.      Pupils: Pupils are equal, round, and reactive to light.   Cardiovascular:      Rate and Rhythm: Normal rate and regular rhythm.      Pulses: Normal pulses.      Heart sounds: Normal heart sounds.   Pulmonary:      Effort: Pulmonary effort is normal.      Breath sounds: Normal breath sounds.   Musculoskeletal:      Lumbar back: Tenderness (overlying BL SI joint) present. No swelling, edema or bony tenderness. Normal range of motion. Negative right straight leg raise test and negative left straight leg raise test.      Right foot: Normal range of motion. Tenderness present. No swelling, deformity, foot drop or bony tenderness.      Left foot: Normal range of motion. Tenderness present. No swelling, deformity, foot drop or bony tenderness.      Comments: BL posterior achilles to proximal heel mildly ttp, reproducible of complaint   Skin:     General: Skin is warm and dry.   Neurological:      Mental Status: He is alert.   Psychiatric:         Mood and Affect: Mood normal.                  ASSESSMENT AND PLAN        1. Cholesteatoma.  - History of cholesteatoma in the ear, previously treated with surgical intervention twice.  - Recent symptoms suggestive of recurrence, including deep, muffled noise in the ear.  - Advised to consult with Dr. Klever Otero, ENT specialist, for further evaluation and management.  - Referral to Dr. Otero discussed; patient unsure if Dr. Otero is covered by his insurance.    2. Pain of bilateral feet  - suspect Achilles tendinitis  - discussed immobilization with recommended bilateral CAM boot, pt to provide insurance  preferred DME vendor  - discussed usage of CAM boot including fall risk, methods to reduce such risk, and usage x 1-2 weeks  - after immobilization and iceing, start formal PT with referral provided  - avoid exacerbating motions  - if symptoms persistent or worsening, follow up podiatry  - XR ordered due to chronicity of symptoms, discussed role of calcaneal spur    3. Pain of bilateral low back pain  - suspect SI joint dysfunction  - discussed rest, ice, avoidance exacerbating motions, lumbar support in all seated positions  - HEP per AVS provided ; advocated for yoga vs pilates  - start PT with preference for heels pain first  - XR due to chronicity    4. Health maintenance  - fasting labwork including request for T check, obtain at least few days prior to CPE  - recommended Tdap, pt defers today    1. Encounter to establish care  -     CBC with Automated Differential; Future  -     Comprehensive Metabolic Panel; Future  -     Lipid Panel With Reflex; Future  -     Thyroid Stimulating Hormone Reflex; Future  -     Free T4; Future  -     Urinalysis & Reflex Microscopy; Future  2. Pain in both feet  -     XR ANKLE MIN 3 VIEWS BILATERAL; Future  -     SERVICE TO PODIATRY  -     SERVICE TO PODIATRY  3. Chronic bilateral low back pain without sciatica  -     XR LUMBAR SPINE 2 OR 3 VIEWS; Future  -     SERVICE TO PHYSICAL THERAPY  4. At risk for nutrition deficiency  -     Vitamin B12 And Folate; Future  -     Iron And total Iron Binding Capacity; Future  -     Ferritin; Future  5. Low libido  -     Testosterone, Total, Free And Bioavailable, Male; Future  6. Vitamin D deficiency  -     Vitamin D -25 Hydroxy; Future      Return for 3-4 months CPE or sooner prn.         same name as above

## (undated) DEVICE — PACK ROBOTIC LIJ

## (undated) DEVICE — SUT POLYSORB 2-0 30" V-20 UNDYED

## (undated) DEVICE — URETERAL CATH RED RUBBER 12FR (WHITE)

## (undated) DEVICE — ELCTR BOVIE TIP BLADE INSULATED 2.75" EDGE

## (undated) DEVICE — WARMING BLANKET LOWER ADULT

## (undated) DEVICE — DRAPE INSTRUMENT POUCH 6.75" X 11"

## (undated) DEVICE — ELCTR BOVIE TIP BLADE INSULATED 6.5" EDGE

## (undated) DEVICE — DRSG TEGADERM 2.5X3"

## (undated) DEVICE — TROCAR COVIDIEN VERSAONE FIXATION CANNULA 5MM

## (undated) DEVICE — DRSG CURITY GAUZE SPONGE 4 X 4" 12-PLY

## (undated) DEVICE — GRASPER LAPA 5MMX35CM

## (undated) DEVICE — DRSG STERISTRIPS 0.5 X 4"

## (undated) DEVICE — Device

## (undated) DEVICE — DRSG ALLEVYN BORDER LITE 2X2"

## (undated) DEVICE — SPECIMEN CONTAINER 100ML

## (undated) DEVICE — SUT POLYSORB 4-0 P-12 UNDYED

## (undated) DEVICE — SUT SURGIPRO 0 30" GS-22

## (undated) DEVICE — TUBING SUCTION 20FT

## (undated) DEVICE — SOL IRR POUR NS 0.9% 500ML

## (undated) DEVICE — DRAPE MAYO STAND 30"

## (undated) DEVICE — GOWN XL

## (undated) DEVICE — VENODYNE/SCD SLEEVE CALF MEDIUM